# Patient Record
Sex: FEMALE
[De-identification: names, ages, dates, MRNs, and addresses within clinical notes are randomized per-mention and may not be internally consistent; named-entity substitution may affect disease eponyms.]

---

## 2021-11-21 ENCOUNTER — NURSE TRIAGE (OUTPATIENT)
Dept: OTHER | Facility: CLINIC | Age: 66
End: 2021-11-21

## 2021-11-21 NOTE — TELEPHONE ENCOUNTER
Reason for Disposition   [1] Caller requesting NON-URGENT health information AND [2] PCP's office is the best resource    Answer Assessment - Initial Assessment Questions  1. REASON FOR CALL or QUESTION: \"What is your reason for calling today? \" or \"How can I best help you? \" or \"What question do you have that I can help answer? \"      Pt wanting to voice concerns about recent medical testing/referral to cardiologist, who she feels is looking for conditions when she feels it is not warranted. Also concerned about recently beginning taking atenolol an feeling fatigued, as well as concerned about stopping it abruptly, as she no longer wants to take it. Also seeking information about getting a second opinion from a different cardiologist.    Protocols used: INFORMATION ONLY CALL - NO TRIAGE-ADULT-    Brief description of triage: No triage. Pt seeking information. Triage indicates for patient to call PCP/cardiologist office when open. Provided information on atenolol from [employer] database:    Common side effects can include: dizziness, fatigue. Beta-blocker therapy should not be withdrawn abruptly (pt is on lowest recommended dose of 25 mg daily, told by cardio to go to once every other day). Pt will call back to [facility] customer service line tomorrow for information regarding other cardiologists in network. Care advice provided, patient verbalizes understanding; denies any other questions or concerns; instructed to call back for any new or worsening symptoms. Unable to route encounter. This triage is a result of a call to [address] [place] [address]. Please do not respond to the triage nurse through this encounter. Any subsequent communication should be directly with the patient.

## 2023-03-27 DIAGNOSIS — J45.41 MODERATE PERSISTENT ALLERGIC ASTHMA WITH ACUTE EXACERBATION (HHS-HCC): ICD-10-CM

## 2023-03-27 RX ORDER — MONTELUKAST SODIUM 10 MG/1
10 TABLET ORAL DAILY
Qty: 90 TABLET | Refills: 3 | Status: SHIPPED | OUTPATIENT
Start: 2023-03-27 | End: 2024-03-18 | Stop reason: SDUPTHER

## 2023-03-27 RX ORDER — MONTELUKAST SODIUM 10 MG/1
10 TABLET ORAL DAILY
COMMUNITY
Start: 2012-12-11 | End: 2023-03-27 | Stop reason: SDUPTHER

## 2023-03-27 RX ORDER — MOMETASONE FUROATE AND FORMOTEROL FUMARATE DIHYDRATE 200; 5 UG/1; UG/1
2 AEROSOL RESPIRATORY (INHALATION)
COMMUNITY
Start: 2017-01-31 | End: 2023-03-27 | Stop reason: SDUPTHER

## 2023-03-27 RX ORDER — MOMETASONE FUROATE AND FORMOTEROL FUMARATE DIHYDRATE 200; 5 UG/1; UG/1
AEROSOL RESPIRATORY (INHALATION)
Qty: 39 G | Refills: 3 | Status: SHIPPED | OUTPATIENT
Start: 2023-03-27 | End: 2024-03-27 | Stop reason: SDUPTHER

## 2023-04-19 LAB
ANION GAP IN SER/PLAS: 10 MMOL/L (ref 10–20)
C REACTIVE PROTEIN (MG/L) IN SER/PLAS: 0.77 MG/DL
CALCIUM (MG/DL) IN SER/PLAS: 9.7 MG/DL (ref 8.6–10.3)
CARBON DIOXIDE, TOTAL (MMOL/L) IN SER/PLAS: 30 MMOL/L (ref 21–32)
CHLORIDE (MMOL/L) IN SER/PLAS: 104 MMOL/L (ref 98–107)
CREATININE (MG/DL) IN SER/PLAS: 0.63 MG/DL (ref 0.5–1.05)
ESTIMATED AVERAGE GLUCOSE FOR HBA1C: 105 MG/DL
GFR FEMALE: >90 ML/MIN/1.73M2
GLUCOSE (MG/DL) IN SER/PLAS: 83 MG/DL (ref 74–99)
HEMOGLOBIN A1C/HEMOGLOBIN TOTAL IN BLOOD: 5.3 %
POTASSIUM (MMOL/L) IN SER/PLAS: 3.9 MMOL/L (ref 3.5–5.3)
SEDIMENTATION RATE, ERYTHROCYTE: 10 MM/H (ref 0–30)
SODIUM (MMOL/L) IN SER/PLAS: 140 MMOL/L (ref 136–145)
UREA NITROGEN (MG/DL) IN SER/PLAS: 13 MG/DL (ref 6–23)

## 2023-04-24 DIAGNOSIS — I10 BENIGN ESSENTIAL HYPERTENSION: ICD-10-CM

## 2023-04-24 RX ORDER — LOSARTAN POTASSIUM 50 MG/1
50 TABLET ORAL DAILY
COMMUNITY
Start: 2013-05-03 | End: 2023-04-24 | Stop reason: SDUPTHER

## 2023-04-24 RX ORDER — LOSARTAN POTASSIUM 50 MG/1
50 TABLET ORAL DAILY
Qty: 90 TABLET | Refills: 3 | Status: SHIPPED | OUTPATIENT
Start: 2023-04-24 | End: 2024-04-19 | Stop reason: SDUPTHER

## 2023-06-07 ENCOUNTER — OFFICE VISIT (OUTPATIENT)
Dept: PRIMARY CARE | Facility: CLINIC | Age: 68
End: 2023-06-07
Payer: MEDICARE

## 2023-06-07 VITALS
HEART RATE: 79 BPM | WEIGHT: 132 LBS | TEMPERATURE: 98.6 F | SYSTOLIC BLOOD PRESSURE: 126 MMHG | HEIGHT: 60 IN | OXYGEN SATURATION: 96 % | DIASTOLIC BLOOD PRESSURE: 79 MMHG | BODY MASS INDEX: 25.91 KG/M2

## 2023-06-07 DIAGNOSIS — I10 BENIGN ESSENTIAL HYPERTENSION: ICD-10-CM

## 2023-06-07 DIAGNOSIS — J45.41 MODERATE PERSISTENT ALLERGIC ASTHMA WITH ACUTE EXACERBATION (HHS-HCC): Primary | ICD-10-CM

## 2023-06-07 DIAGNOSIS — J30.9 ALLERGIC RHINITIS, UNSPECIFIED SEASONALITY, UNSPECIFIED TRIGGER: ICD-10-CM

## 2023-06-07 DIAGNOSIS — K21.9 GASTROESOPHAGEAL REFLUX DISEASE WITHOUT ESOPHAGITIS: ICD-10-CM

## 2023-06-07 DIAGNOSIS — E11.9 DIABETES MELLITUS TYPE 2 WITHOUT RETINOPATHY (MULTI): ICD-10-CM

## 2023-06-07 DIAGNOSIS — E55.9 VITAMIN D DEFICIENCY: ICD-10-CM

## 2023-06-07 PROBLEM — M79.674 CHRONIC PAIN OF TOE OF RIGHT FOOT: Status: ACTIVE | Noted: 2023-06-07

## 2023-06-07 PROBLEM — J45.909 ASTHMA (HHS-HCC): Status: ACTIVE | Noted: 2023-06-07

## 2023-06-07 PROBLEM — M79.675 CHRONIC PAIN OF TOE OF LEFT FOOT: Status: ACTIVE | Noted: 2023-06-07

## 2023-06-07 PROBLEM — I28.8 ENLARGED PULMONARY ARTERY (MULTI): Status: ACTIVE | Noted: 2023-06-07

## 2023-06-07 PROBLEM — E66.3 OVERWEIGHT: Status: ACTIVE | Noted: 2023-06-07

## 2023-06-07 PROBLEM — R11.2 NAUSEA WITH VOMITING: Status: ACTIVE | Noted: 2023-06-07

## 2023-06-07 PROBLEM — R42 VERTIGO: Status: ACTIVE | Noted: 2023-06-07

## 2023-06-07 PROBLEM — L82.0 SEBORRHEIC KERATOSES, INFLAMED: Status: ACTIVE | Noted: 2023-06-07

## 2023-06-07 PROBLEM — B37.31 VAGINAL CANDIDIASIS: Status: ACTIVE | Noted: 2023-06-07

## 2023-06-07 PROBLEM — S92.323A CLOSED FRACTURE OF SECOND METATARSAL BONE: Status: ACTIVE | Noted: 2023-06-07

## 2023-06-07 PROBLEM — R94.39 ABNORMAL STRESS TEST: Status: ACTIVE | Noted: 2023-06-07

## 2023-06-07 PROBLEM — L30.9 ECZEMA: Status: ACTIVE | Noted: 2023-06-07

## 2023-06-07 PROBLEM — N76.0 BACTERIAL VAGINOSIS: Status: ACTIVE | Noted: 2023-06-07

## 2023-06-07 PROBLEM — I71.21 ANEURYSM OF ASCENDING AORTA (CMS-HCC): Status: ACTIVE | Noted: 2023-06-07

## 2023-06-07 PROBLEM — R68.89 FLU-LIKE SYMPTOMS: Status: ACTIVE | Noted: 2023-06-07

## 2023-06-07 PROBLEM — L27.0 ALLERGIC DRUG RASH: Status: ACTIVE | Noted: 2023-06-07

## 2023-06-07 PROBLEM — Q25.79 PULMONARY ARTERY ABNORMALITY (HHS-HCC): Status: ACTIVE | Noted: 2023-06-07

## 2023-06-07 PROBLEM — J02.9 SORE THROAT: Status: ACTIVE | Noted: 2023-06-07

## 2023-06-07 PROBLEM — N64.89 BREAST ASYMMETRY: Status: ACTIVE | Noted: 2023-06-07

## 2023-06-07 PROBLEM — B35.1 NAIL FUNGUS: Status: ACTIVE | Noted: 2023-06-07

## 2023-06-07 PROBLEM — I28.8 ENLARGED PULMONARY ARTERY (MULTI): Status: RESOLVED | Noted: 2023-06-07 | Resolved: 2023-06-07

## 2023-06-07 PROBLEM — G89.29 CHRONIC PAIN OF TOE OF LEFT FOOT: Status: ACTIVE | Noted: 2023-06-07

## 2023-06-07 PROBLEM — J22 ACUTE RESPIRATORY INFECTION: Status: ACTIVE | Noted: 2023-06-07

## 2023-06-07 PROBLEM — R29.890 LOSS OF HEIGHT: Status: ACTIVE | Noted: 2023-06-07

## 2023-06-07 PROBLEM — M85.80 OSTEOPENIA: Status: ACTIVE | Noted: 2023-06-07

## 2023-06-07 PROBLEM — N89.8 VAGINAL ITCHING: Status: ACTIVE | Noted: 2023-06-07

## 2023-06-07 PROBLEM — R06.81 WITNESSED APNEIC SPELLS: Status: ACTIVE | Noted: 2023-06-07

## 2023-06-07 PROBLEM — R06.00 DYSPNEA: Status: ACTIVE | Noted: 2023-06-07

## 2023-06-07 PROBLEM — N39.0 ACUTE UTI: Status: ACTIVE | Noted: 2023-06-07

## 2023-06-07 PROBLEM — R06.02 SOB (SHORTNESS OF BREATH): Status: ACTIVE | Noted: 2023-06-07

## 2023-06-07 PROBLEM — R73.01 IMPAIRED FASTING GLUCOSE: Status: ACTIVE | Noted: 2023-06-07

## 2023-06-07 PROBLEM — L98.9 SKIN LESION: Status: ACTIVE | Noted: 2023-06-07

## 2023-06-07 PROBLEM — N76.0 VAGINITIS: Status: ACTIVE | Noted: 2023-06-07

## 2023-06-07 PROBLEM — N90.89 VULVAR IRRITATION: Status: ACTIVE | Noted: 2023-06-07

## 2023-06-07 PROBLEM — G89.29 CHRONIC PAIN OF TOE OF RIGHT FOOT: Status: ACTIVE | Noted: 2023-06-07

## 2023-06-07 PROBLEM — E78.5 HYPERLIPIDEMIA: Status: ACTIVE | Noted: 2023-06-07

## 2023-06-07 PROBLEM — M71.371 OTHER BURSAL CYST, RIGHT ANKLE AND FOOT: Status: ACTIVE | Noted: 2023-06-07

## 2023-06-07 PROBLEM — H35.30 MACULAR DEGENERATION: Status: ACTIVE | Noted: 2023-06-07

## 2023-06-07 PROBLEM — B96.89 BACTERIAL VAGINOSIS: Status: ACTIVE | Noted: 2023-06-07

## 2023-06-07 PROBLEM — R21 RASH: Status: ACTIVE | Noted: 2023-06-07

## 2023-06-07 PROBLEM — L29.9 PRURITUS: Status: ACTIVE | Noted: 2023-06-07

## 2023-06-07 PROBLEM — I71.21 ANEURYSM OF ASCENDING AORTA (CMS-HCC): Status: RESOLVED | Noted: 2023-06-07 | Resolved: 2023-06-07

## 2023-06-07 PROBLEM — R29.818 SUSPECTED SLEEP APNEA: Status: ACTIVE | Noted: 2023-06-07

## 2023-06-07 PROBLEM — N89.8 VAGINAL DISCHARGE: Status: ACTIVE | Noted: 2023-06-07

## 2023-06-07 PROCEDURE — 1159F MED LIST DOCD IN RCRD: CPT | Performed by: INTERNAL MEDICINE

## 2023-06-07 PROCEDURE — 3078F DIAST BP <80 MM HG: CPT | Performed by: INTERNAL MEDICINE

## 2023-06-07 PROCEDURE — 3044F HG A1C LEVEL LT 7.0%: CPT | Performed by: INTERNAL MEDICINE

## 2023-06-07 PROCEDURE — 3074F SYST BP LT 130 MM HG: CPT | Performed by: INTERNAL MEDICINE

## 2023-06-07 PROCEDURE — 99214 OFFICE O/P EST MOD 30 MIN: CPT | Performed by: INTERNAL MEDICINE

## 2023-06-07 PROCEDURE — 4010F ACE/ARB THERAPY RXD/TAKEN: CPT | Performed by: INTERNAL MEDICINE

## 2023-06-07 PROCEDURE — 1036F TOBACCO NON-USER: CPT | Performed by: INTERNAL MEDICINE

## 2023-06-07 PROCEDURE — 1160F RVW MEDS BY RX/DR IN RCRD: CPT | Performed by: INTERNAL MEDICINE

## 2023-06-07 RX ORDER — ESOMEPRAZOLE MAGNESIUM 40 MG/1
40 CAPSULE, DELAYED RELEASE ORAL DAILY
COMMUNITY
Start: 2013-06-10

## 2023-06-07 RX ORDER — VITS A,C,E/LUTEIN/MINERALS 300MCG-200
1 TABLET ORAL DAILY
COMMUNITY
Start: 2012-12-07 | End: 2023-10-21 | Stop reason: ALTCHOICE

## 2023-06-07 RX ORDER — MULTIVITAMIN
1 TABLET ORAL DAILY
COMMUNITY
End: 2023-06-10 | Stop reason: ALTCHOICE

## 2023-06-07 RX ORDER — CHOLECALCIFEROL (VITAMIN D3) 25 MCG
25 TABLET ORAL DAILY
COMMUNITY

## 2023-06-07 RX ORDER — PHENYLEPHRINE HCL 10 MG
1000 TABLET ORAL DAILY
COMMUNITY

## 2023-06-07 RX ORDER — AMOXICILLIN 500 MG
1 CAPSULE ORAL DAILY
COMMUNITY

## 2023-06-07 RX ORDER — OLOPATADINE HYDROCHLORIDE 1 MG/ML
1 SOLUTION/ DROPS OPHTHALMIC 2 TIMES DAILY
COMMUNITY
Start: 2020-06-29

## 2023-06-07 RX ORDER — CALCIUM CITRATE/VITAMIN D3 200MG-6.25
1 TABLET ORAL DAILY
COMMUNITY
Start: 2019-05-17

## 2023-06-07 RX ORDER — LORATADINE 10 MG/1
1 CAPSULE, LIQUID FILLED ORAL AS NEEDED
COMMUNITY
Start: 2016-05-17

## 2023-06-07 RX ORDER — ALBUTEROL SULFATE 90 UG/1
2 AEROSOL, METERED RESPIRATORY (INHALATION) EVERY 6 HOURS PRN
COMMUNITY
End: 2024-02-06 | Stop reason: SDUPTHER

## 2023-06-07 ASSESSMENT — PATIENT HEALTH QUESTIONNAIRE - PHQ9
SUM OF ALL RESPONSES TO PHQ9 QUESTIONS 1 AND 2: 0
2. FEELING DOWN, DEPRESSED OR HOPELESS: NOT AT ALL
1. LITTLE INTEREST OR PLEASURE IN DOING THINGS: NOT AT ALL

## 2023-06-07 NOTE — PROGRESS NOTES
"Subjective   Patient ID: Lydia Lowe is a 67 y.o. female who presents for 4 month follow up.    Here for follow up.  She has h/o mild intermittent asthma and to follow up from recent  visit for exacerbation asthma,now feeling better.  she was seen by cardiologist and Rheumatologist for possible aortitis and GCA,just had Bx left temporal artery by Dr Montesinos on 1/17/23 and there was no evidence of GCA.  She was referred by Dr Calles to card vascula specialist,pt \"is thinking about it and does not think it is necessary\"     she has HTN,mild intermittent asthma,allergic rhinitis .her diabetes is well controlled with diet only.last A1c at goal,she continue to walk regularly and lost some weight,no CP,SOB.her allergies are stable.her endo Dr Martinez retired and I will be taking care of her diabetes that is well controlled with lifestyle modification.     she gets eye exam regularly by Dr Ha(retina),Dr Keys eye 4/2021 up to date     she sees gyn for pelvic and breast exam.     in past she saw Dr Bailey and had negative stress test and had echo.               Review of Systems   Reason unable to perform ROS: as HPI.       Objective   /79 (BP Location: Left arm, Patient Position: Sitting, BP Cuff Size: Large adult)   Pulse 79   Temp 37 °C (98.6 °F) (Temporal)   Ht 1.524 m (5')   Wt 59.9 kg (132 lb)   SpO2 96%   BMI 25.78 kg/m²     Physical Exam  Constitutional:       Appearance: Normal appearance.   HENT:      Head: Normocephalic and atraumatic.   Eyes:      Extraocular Movements: Extraocular movements intact.      Pupils: Pupils are equal, round, and reactive to light.   Cardiovascular:      Rate and Rhythm: Normal rate and regular rhythm.      Heart sounds: Normal heart sounds.   Pulmonary:      Effort: Pulmonary effort is normal.      Breath sounds: Normal breath sounds. No wheezing or rhonchi.   Abdominal:      General: There is no distension.   Musculoskeletal:         General: Normal " range of motion.      Cervical back: Normal range of motion and neck supple.      Left lower leg: No edema.   Skin:     General: Skin is warm.   Neurological:      General: No focal deficit present.      Mental Status: She is alert and oriented to person, place, and time.   Psychiatric:         Mood and Affect: Mood normal.         Behavior: Behavior normal.         Assessment/Plan   Problem List Items Addressed This Visit          Respiratory    Moderate persistent allergic asthma with acute exacerbation - Primary       Circulatory    Benign essential hypertension     Stable on current med.            Digestive    GERD (gastroesophageal reflux disease)     Avoid offending food.            Endocrine/Metabolic    Diabetes mellitus type 2 without retinopathy (CMS/HCC)     Well controlled with diet and exercise.  Order A1C.         Relevant Orders    Hemoglobin A1C    Vitamin D deficiency       Other    Allergic rhinitis

## 2023-06-09 RX ORDER — MOMETASONE FUROATE 50 UG/1
2 SPRAY, METERED NASAL DAILY
COMMUNITY

## 2023-06-19 NOTE — PROGRESS NOTES
Subjective   Patient ID: Lydia Lowe is a 67 y.o. female who presents for 4 month follow up.    This is a late entry.  Pt's current retina specialist is Dr Demond Palacio.  Pt was seen at [facility] ER and not  for her recent asthma flare up.         Review of Systems    Objective   /79 (BP Location: Left arm, Patient Position: Sitting, BP Cuff Size: Large adult)   Pulse 79   Temp 37 °C (98.6 °F) (Temporal)   Ht 1.524 m (5')   Wt 59.9 kg (132 lb)   SpO2 96%   BMI 25.78 kg/m²     Physical Exam    Assessment/Plan

## 2023-10-06 ENCOUNTER — HOSPITAL ENCOUNTER (OUTPATIENT)
Dept: RADIOLOGY | Facility: HOSPITAL | Age: 68
Discharge: HOME | End: 2023-10-06
Payer: MEDICARE

## 2023-10-06 VITALS — BODY MASS INDEX: 25.13 KG/M2 | HEIGHT: 60 IN | WEIGHT: 128 LBS

## 2023-10-06 DIAGNOSIS — Z12.31 ENCOUNTER FOR SCREENING MAMMOGRAM FOR MALIGNANT NEOPLASM OF BREAST: ICD-10-CM

## 2023-10-06 PROCEDURE — 77067 SCR MAMMO BI INCL CAD: CPT | Mod: BILATERAL PROCEDURE | Performed by: STUDENT IN AN ORGANIZED HEALTH CARE EDUCATION/TRAINING PROGRAM

## 2023-10-06 PROCEDURE — 77063 BREAST TOMOSYNTHESIS BI: CPT | Mod: BILATERAL PROCEDURE | Performed by: STUDENT IN AN ORGANIZED HEALTH CARE EDUCATION/TRAINING PROGRAM

## 2023-10-06 PROCEDURE — 77067 SCR MAMMO BI INCL CAD: CPT | Mod: 50

## 2023-10-10 ENCOUNTER — TELEPHONE (OUTPATIENT)
Dept: OBSTETRICS AND GYNECOLOGY | Facility: CLINIC | Age: 68
End: 2023-10-10
Payer: MEDICARE

## 2023-10-10 NOTE — TELEPHONE ENCOUNTER
Shivani Marie, APRN-CNP  [id number] Clinical Support Staff  Hi - I messaged the patient regarding this because she had already viewed the result, but if you could just confirm that she has received that elena and review that she needs a diagnostic mammo. Mammo is already ordered. Thank you!    Pt contacted and notified of above.

## 2023-10-11 ENCOUNTER — TELEPHONE (OUTPATIENT)
Dept: OBSTETRICS AND GYNECOLOGY | Facility: CLINIC | Age: 68
End: 2023-10-11
Payer: MEDICARE

## 2023-10-11 NOTE — TELEPHONE ENCOUNTER
Pt contacted.    Discussed recent mammogram.   Pt informed it was compared to previous films from 2022, 2021, and 2020.

## 2023-10-17 ENCOUNTER — LAB (OUTPATIENT)
Dept: LAB | Facility: LAB | Age: 68
End: 2023-10-17
Payer: MEDICARE

## 2023-10-17 DIAGNOSIS — E11.9 DIABETES MELLITUS TYPE 2 WITHOUT RETINOPATHY (MULTI): ICD-10-CM

## 2023-10-17 LAB
EST. AVERAGE GLUCOSE BLD GHB EST-MCNC: 108 MG/DL
HBA1C MFR BLD: 5.4 %

## 2023-10-17 PROCEDURE — 36415 COLL VENOUS BLD VENIPUNCTURE: CPT

## 2023-10-17 PROCEDURE — 83036 HEMOGLOBIN GLYCOSYLATED A1C: CPT

## 2023-10-19 PROBLEM — R70.0 ESR RAISED: Status: ACTIVE | Noted: 2023-10-19

## 2023-10-19 PROBLEM — L28.0 LICHEN SIMPLEX CHRONICUS: Status: ACTIVE | Noted: 2018-07-06

## 2023-10-19 PROBLEM — I77.6: Status: ACTIVE | Noted: 2023-10-19

## 2023-10-19 PROBLEM — L82.1 SEBORRHEIC KERATOSIS: Status: ACTIVE | Noted: 2018-07-06

## 2023-10-19 PROBLEM — D36.7 BENIGN NEOPLASM OF TRUNK: Status: ACTIVE | Noted: 2018-07-06

## 2023-10-19 RX ORDER — FLUTICASONE PROPIONATE 0.5 MG/G
CREAM TOPICAL
COMMUNITY

## 2023-10-19 RX ORDER — MUPIROCIN 20 MG/G
1 OINTMENT TOPICAL
COMMUNITY
Start: 2018-05-24 | End: 2023-10-20 | Stop reason: ALTCHOICE

## 2023-10-19 RX ORDER — MUPIROCIN CALCIUM 20 MG/G
1 CREAM TOPICAL
COMMUNITY
Start: 2018-05-23 | End: 2023-10-20 | Stop reason: ALTCHOICE

## 2023-10-19 RX ORDER — CLOBETASOL PROPIONATE 0.5 MG/G
1 CREAM TOPICAL
COMMUNITY
Start: 2023-08-17 | End: 2024-03-14 | Stop reason: WASHOUT

## 2023-10-19 RX ORDER — NYSTATIN 100000 [USP'U]/ML
SUSPENSION ORAL
COMMUNITY
Start: 2023-09-27 | End: 2023-10-20 | Stop reason: ALTCHOICE

## 2023-10-19 RX ORDER — BISOPROLOL FUMARATE 5 MG/1
2.5 TABLET, FILM COATED ORAL DAILY
COMMUNITY
Start: 2022-12-08 | End: 2023-10-21 | Stop reason: ALTCHOICE

## 2023-10-20 ENCOUNTER — OFFICE VISIT (OUTPATIENT)
Dept: PRIMARY CARE | Facility: CLINIC | Age: 68
End: 2023-10-20
Payer: MEDICARE

## 2023-10-20 VITALS
SYSTOLIC BLOOD PRESSURE: 127 MMHG | TEMPERATURE: 97.7 F | DIASTOLIC BLOOD PRESSURE: 82 MMHG | OXYGEN SATURATION: 95 % | HEART RATE: 82 BPM | BODY MASS INDEX: 26.95 KG/M2 | WEIGHT: 128.4 LBS | HEIGHT: 58 IN

## 2023-10-20 DIAGNOSIS — Z00.00 HEALTHCARE MAINTENANCE: ICD-10-CM

## 2023-10-20 DIAGNOSIS — E78.49 OTHER HYPERLIPIDEMIA: ICD-10-CM

## 2023-10-20 DIAGNOSIS — K21.9 GASTROESOPHAGEAL REFLUX DISEASE WITHOUT ESOPHAGITIS: ICD-10-CM

## 2023-10-20 DIAGNOSIS — E11.9 DIABETES MELLITUS TYPE 2 WITHOUT RETINOPATHY (MULTI): ICD-10-CM

## 2023-10-20 DIAGNOSIS — Z00.00 MEDICARE ANNUAL WELLNESS VISIT, SUBSEQUENT: Primary | ICD-10-CM

## 2023-10-20 DIAGNOSIS — H35.3222 EXUDATIVE AGE-RELATED MACULAR DEGENERATION, LEFT EYE, WITH INACTIVE CHOROIDAL NEOVASCULARIZATION (MULTI): ICD-10-CM

## 2023-10-20 DIAGNOSIS — M85.80 OSTEOPENIA, UNSPECIFIED LOCATION: ICD-10-CM

## 2023-10-20 DIAGNOSIS — Z00.00 ANNUAL PHYSICAL EXAM: ICD-10-CM

## 2023-10-20 DIAGNOSIS — I10 BENIGN ESSENTIAL HYPERTENSION: ICD-10-CM

## 2023-10-20 DIAGNOSIS — E55.9 VITAMIN D DEFICIENCY: ICD-10-CM

## 2023-10-20 DIAGNOSIS — J45.20 MILD INTERMITTENT ASTHMA WITHOUT COMPLICATION (HHS-HCC): ICD-10-CM

## 2023-10-20 DIAGNOSIS — Z00.00 ROUTINE GENERAL MEDICAL EXAMINATION AT HEALTH CARE FACILITY: ICD-10-CM

## 2023-10-20 PROCEDURE — 99397 PER PM REEVAL EST PAT 65+ YR: CPT | Performed by: INTERNAL MEDICINE

## 2023-10-20 PROCEDURE — 4010F ACE/ARB THERAPY RXD/TAKEN: CPT | Performed by: INTERNAL MEDICINE

## 2023-10-20 PROCEDURE — 1170F FXNL STATUS ASSESSED: CPT | Performed by: INTERNAL MEDICINE

## 2023-10-20 PROCEDURE — 1160F RVW MEDS BY RX/DR IN RCRD: CPT | Performed by: INTERNAL MEDICINE

## 2023-10-20 PROCEDURE — 1036F TOBACCO NON-USER: CPT | Performed by: INTERNAL MEDICINE

## 2023-10-20 PROCEDURE — 3044F HG A1C LEVEL LT 7.0%: CPT | Performed by: INTERNAL MEDICINE

## 2023-10-20 PROCEDURE — 1126F AMNT PAIN NOTED NONE PRSNT: CPT | Performed by: INTERNAL MEDICINE

## 2023-10-20 PROCEDURE — 3074F SYST BP LT 130 MM HG: CPT | Performed by: INTERNAL MEDICINE

## 2023-10-20 PROCEDURE — 1159F MED LIST DOCD IN RCRD: CPT | Performed by: INTERNAL MEDICINE

## 2023-10-20 PROCEDURE — G0442 ANNUAL ALCOHOL SCREEN 15 MIN: HCPCS | Performed by: INTERNAL MEDICINE

## 2023-10-20 PROCEDURE — G0008 ADMIN INFLUENZA VIRUS VAC: HCPCS | Performed by: INTERNAL MEDICINE

## 2023-10-20 PROCEDURE — G0439 PPPS, SUBSEQ VISIT: HCPCS | Performed by: INTERNAL MEDICINE

## 2023-10-20 PROCEDURE — G0444 DEPRESSION SCREEN ANNUAL: HCPCS | Performed by: INTERNAL MEDICINE

## 2023-10-20 PROCEDURE — 90662 IIV NO PRSV INCREASED AG IM: CPT | Performed by: INTERNAL MEDICINE

## 2023-10-20 PROCEDURE — 3079F DIAST BP 80-89 MM HG: CPT | Performed by: INTERNAL MEDICINE

## 2023-10-20 ASSESSMENT — ACTIVITIES OF DAILY LIVING (ADL)
DRESSING: INDEPENDENT
TAKING_MEDICATION: INDEPENDENT
BATHING: INDEPENDENT
MANAGING_FINANCES: INDEPENDENT
GROCERY_SHOPPING: INDEPENDENT
DOING_HOUSEWORK: INDEPENDENT

## 2023-10-20 ASSESSMENT — PATIENT HEALTH QUESTIONNAIRE - PHQ9
2. FEELING DOWN, DEPRESSED OR HOPELESS: NOT AT ALL
1. LITTLE INTEREST OR PLEASURE IN DOING THINGS: NOT AT ALL
SUM OF ALL RESPONSES TO PHQ9 QUESTIONS 1 AND 2: 0

## 2023-10-20 NOTE — PROGRESS NOTES
"Subjective   Patient ID: Lydia Lowe is a 68 y.o. female who presents for Medicare Annual Wellness Visit Subsequent and Annual Exam.    Here for MCR and physical.  she has HTN,mild intermittent asthma,allergic rhinitis .her diabetes is well controlled with diet only.  She sees Dr Palacio and Dr Keys for her eyes,has h/o retinal hemorrhage R eye,not related to diabetes.she has bilateral cataracts.         Review of Systems   Constitutional: Negative.    HENT: Negative.     Eyes: Negative.    Respiratory: Negative.     Cardiovascular: Negative.    Gastrointestinal: Negative.    Genitourinary: Negative.    Neurological: Negative.    Hematological: Negative.    Psychiatric/Behavioral: Negative.         Objective   /82 (BP Location: Left arm, Patient Position: Sitting, BP Cuff Size: Adult)   Pulse 82   Temp 36.5 °C (97.7 °F) (Temporal)   Ht 1.48 m (4' 10.25\")   Wt 58.2 kg (128 lb 6.4 oz)   SpO2 95%   BMI 26.61 kg/m²     Physical Exam  Vitals reviewed.   Constitutional:       Appearance: Normal appearance.   HENT:      Head: Normocephalic and atraumatic.      Right Ear: Tympanic membrane and ear canal normal.      Left Ear: Tympanic membrane and ear canal normal.   Eyes:      Extraocular Movements: Extraocular movements intact.      Pupils: Pupils are equal, round, and reactive to light.   Cardiovascular:      Rate and Rhythm: Normal rate and regular rhythm.      Pulses: Normal pulses.      Heart sounds: Normal heart sounds. No murmur heard.  Pulmonary:      Effort: Pulmonary effort is normal.      Breath sounds: Normal breath sounds.   Abdominal:      General: Abdomen is flat. Bowel sounds are normal.      Palpations: Abdomen is soft.   Musculoskeletal:         General: Normal range of motion.      Cervical back: Normal range of motion and neck supple.   Neurological:      General: No focal deficit present.      Mental Status: She is alert and oriented to person, place, and time.   Psychiatric:         " Mood and Affect: Mood normal.         Assessment/Plan   Problem List Items Addressed This Visit             ICD-10-CM    Asthma J45.909     Stable on current inhalers.         Benign essential hypertension I10     Stable on losartan.         Relevant Orders    CBC and Auto Differential    Diabetes mellitus type 2 without retinopathy (CMS/HCC) E11.9     Well-controlled with diet and exercise.  No retinopathy from diabetes on eye exam.         Relevant Orders    Comprehensive Metabolic Panel    Hemoglobin A1C    Albumin , Urine Random    GERD (gastroesophageal reflux disease) K21.9     Avoid offending food continue PPI.         Hyperlipidemia E78.5     Follow low-fat diet.         Relevant Orders    Lipid Panel    TSH with reflex to Free T4 if abnormal    Osteopenia M85.80    Vitamin D deficiency E55.9    Relevant Orders    Vitamin D 25-Hydroxy,Total (for eval of Vitamin D levels)    Medicare annual wellness visit, subsequent - Primary Z00.00    Annual physical exam Z00.00     Here we will give patient high-dose flu vaccine we will order yearly lab.  Continue regular exercise and healthy eating habits.  Colonoscopy up-to-date.         Exudative age-related macular degeneration, left eye, with inactive choroidal neovascularization (CMS/[facility]) H35.3222     Seen by her ophthalmologist regularly.          Other Visit Diagnoses         Codes    Healthcare maintenance     Z00.00    Relevant Orders    Flu vaccine, quadrivalent, high-dose, preservative free, age 65y+ (FLUZONE) (Completed)    Routine general medical examination at health care facility     Z00.00

## 2023-10-21 PROBLEM — R06.00 DYSPNEA: Status: RESOLVED | Noted: 2023-06-07 | Resolved: 2023-10-21

## 2023-10-21 PROBLEM — R70.0 ESR RAISED: Status: RESOLVED | Noted: 2023-10-19 | Resolved: 2023-10-21

## 2023-10-21 PROBLEM — R68.89 FLU-LIKE SYMPTOMS: Status: RESOLVED | Noted: 2023-06-07 | Resolved: 2023-10-21

## 2023-10-21 PROBLEM — N39.0 ACUTE UTI: Status: RESOLVED | Noted: 2023-06-07 | Resolved: 2023-10-21

## 2023-10-21 PROBLEM — S92.323A CLOSED FRACTURE OF SECOND METATARSAL BONE: Status: RESOLVED | Noted: 2023-06-07 | Resolved: 2023-10-21

## 2023-10-21 PROBLEM — H35.3222 EXUDATIVE AGE-RELATED MACULAR DEGENERATION, LEFT EYE, WITH INACTIVE CHOROIDAL NEOVASCULARIZATION (MULTI): Status: ACTIVE | Noted: 2023-10-21

## 2023-10-21 PROBLEM — I77.6: Status: RESOLVED | Noted: 2023-10-19 | Resolved: 2023-10-21

## 2023-10-21 PROBLEM — J22 ACUTE RESPIRATORY INFECTION: Status: RESOLVED | Noted: 2023-06-07 | Resolved: 2023-10-21

## 2023-10-21 ASSESSMENT — ENCOUNTER SYMPTOMS
CARDIOVASCULAR NEGATIVE: 1
PSYCHIATRIC NEGATIVE: 1
HEMATOLOGIC/LYMPHATIC NEGATIVE: 1
EYES NEGATIVE: 1
RESPIRATORY NEGATIVE: 1
GASTROINTESTINAL NEGATIVE: 1
NEUROLOGICAL NEGATIVE: 1
CONSTITUTIONAL NEGATIVE: 1

## 2023-10-21 NOTE — ASSESSMENT & PLAN NOTE
Here we will give patient high-dose flu vaccine we will order yearly lab.  Continue regular exercise and healthy eating habits.  Colonoscopy up-to-date.

## 2023-10-23 ENCOUNTER — HOSPITAL ENCOUNTER (OUTPATIENT)
Dept: RADIOLOGY | Facility: HOSPITAL | Age: 68
Discharge: HOME | End: 2023-10-23
Payer: MEDICARE

## 2023-10-23 VITALS — BODY MASS INDEX: 26.87 KG/M2 | HEIGHT: 58 IN | WEIGHT: 128 LBS

## 2023-10-23 DIAGNOSIS — N64.89 BREAST ASYMMETRY: Primary | ICD-10-CM

## 2023-10-23 DIAGNOSIS — R92.8 OTHER ABNORMAL AND INCONCLUSIVE FINDINGS ON DIAGNOSTIC IMAGING OF BREAST: ICD-10-CM

## 2023-10-23 PROCEDURE — 77065 DX MAMMO INCL CAD UNI: CPT | Mod: RIGHT SIDE | Performed by: STUDENT IN AN ORGANIZED HEALTH CARE EDUCATION/TRAINING PROGRAM

## 2023-10-23 PROCEDURE — G0279 TOMOSYNTHESIS, MAMMO: HCPCS | Mod: RIGHT SIDE | Performed by: STUDENT IN AN ORGANIZED HEALTH CARE EDUCATION/TRAINING PROGRAM

## 2023-10-23 PROCEDURE — 76642 ULTRASOUND BREAST LIMITED: CPT | Mod: RIGHT SIDE | Performed by: STUDENT IN AN ORGANIZED HEALTH CARE EDUCATION/TRAINING PROGRAM

## 2023-10-23 PROCEDURE — 77061 BREAST TOMOSYNTHESIS UNI: CPT | Mod: RT

## 2023-10-23 PROCEDURE — 76642 ULTRASOUND BREAST LIMITED: CPT | Mod: RT

## 2023-10-23 NOTE — RESULT ENCOUNTER NOTE
Just a heads up... Abnormal right mammo and us. I ordered repeat right diagnostic for 6 months out. I attempted to call pt but received VM. She may return our call but might hear from scheduling first!

## 2023-10-24 ENCOUNTER — TELEPHONE (OUTPATIENT)
Dept: OBSTETRICS AND GYNECOLOGY | Facility: CLINIC | Age: 68
End: 2023-10-24
Payer: MEDICARE

## 2023-10-24 NOTE — TELEPHONE ENCOUNTER
----- Message from ROBBY Lester sent at 10/23/2023  4:45 PM EDT -----  Just a heads up... Abnormal right mammo and us. I ordered repeat right diagnostic for 6 months out. I attempted to call pt but received VM. She may return our call but might hear from scheduling first!

## 2023-10-24 NOTE — TELEPHONE ENCOUNTER
Pt CB and is aware of 10/23/23 Right dx mammo and US results and recommendation for 6 month follow up. Pt stated that she is unsure if she will get the follow up or wait until her next screening.

## 2023-11-06 ENCOUNTER — PROCEDURE VISIT (OUTPATIENT)
Dept: PODIATRY | Facility: CLINIC | Age: 68
End: 2023-11-06
Payer: MEDICARE

## 2023-11-06 DIAGNOSIS — B35.1 NAIL FUNGUS: ICD-10-CM

## 2023-11-06 DIAGNOSIS — G89.29 CHRONIC PAIN OF TOE OF LEFT FOOT: Primary | ICD-10-CM

## 2023-11-06 DIAGNOSIS — G89.29 CHRONIC PAIN OF TOE OF RIGHT FOOT: ICD-10-CM

## 2023-11-06 DIAGNOSIS — M79.675 CHRONIC PAIN OF TOE OF LEFT FOOT: Primary | ICD-10-CM

## 2023-11-06 DIAGNOSIS — M79.674 CHRONIC PAIN OF TOE OF RIGHT FOOT: ICD-10-CM

## 2023-11-06 PROCEDURE — 11721 DEBRIDE NAIL 6 OR MORE: CPT | Performed by: PODIATRIST

## 2023-11-06 NOTE — PROGRESS NOTES
/History Of Present Illness  Lydia Lowe is a 68 y.o. female presenting for diabetic nail care. Patient complains with painful elongated nails.     Past Medical History  She has a past medical history of Allergic (1980s?), Asthma (2000?), Cataract (2022), Diabetes mellitus (CMS/[facility]) (2016), Eczema (2018), Encounter for general adult medical examination without abnormal findings (11/14/2012), Encounter for gynecological examination (general) (routine) without abnormal findings, ESR raised (10/19/2023), Gastro-esophageal reflux disease with esophagitis, without bleeding (07/18/2014), GERD (gastroesophageal reflux disease) (1999), Hypertension (2001?), Impaired fasting glucose, Other conditions influencing health status, Other conditions influencing health status (12/12/2012), Other specified noninflammatory disorders of vulva and perineum (08/30/2021), Personal history of diseases of the skin and subcutaneous tissue (04/12/2013), Personal history of other diseases of the circulatory system, Personal history of other diseases of the respiratory system, Personal history of other diseases of the respiratory system, Personal history of other diseases of the respiratory system, Urinary tract infection (Over 5 years ago?), Varicella (1958?), and Visual impairment (1963?).    Surgical History  She has a past surgical history that includes Colonoscopy (11/27/2012); Other surgical history (11/27/2012); Dilation and curettage of uterus (12/07/2012); Other surgical history (08/18/2017); Eye surgery (1993); and [place] tooth extraction (1994?).     Social History  She reports that she has never smoked. She has never used smokeless tobacco. She reports current alcohol use. She reports that she does not use drugs.    Family History  Family History   Problem Relation Name Age of Onset    Breast cancer Mother Mom? 81    Asthma Mother Mom?     Hypertension Mother Mom?     Brain cancer Father Dad?     Diabetes Father Dad?      Colon cancer Father Dad?     COPD Father Dad?     Hypertension Father Dad?     Heart attack Sister      Stroke Sister      Coronary artery disease Brother          CABG    Stroke Brother      Hypertension Sibling      Stroke Sister Sheridan         Allergies  Carbapenems, Penicillins, Sulfa (sulfonamide antibiotics), and Nitrofurantoin monohyd/m-cryst    Medications  Current Outpatient Medications   Medication Sig Dispense Refill    albuterol 90 mcg/actuation inhaler Inhale 2 puffs every 6 hours if needed for wheezing. 2 puffs every 4-6 hrs prn.      isma cit-mag-D3-Zn--man-bor (Citracal-D3 Plus Magnesium) 250- mg-mg-unit tablet Take 1 tablet by mouth once daily.      cholecalciferol (Vitamin D3) 25 MCG (1000 UT) tablet Take 1 tablet (25 mcg) by mouth once daily.      cinnamon 500 mg capsule Take 1,000 capsules (500,000 mg) by mouth once daily.      clobetasol (Temovate) 0.05 % cream Apply 1 Application topically.      esomeprazole (NexIUM) 40 mg DR capsule Take 1 capsule (40 mg) by mouth once daily.      fluticasone (Cutivate) 0.05 % cream Apply topically.      loratadine (Claritin Liqui-Gel) 10 mg capsule Take 1 capsule by mouth if needed.      losartan (Cozaar) 50 mg tablet Take 1 tablet (50 mg) by mouth once daily. 90 tablet 3    mometasone (Nasonex) 50 mcg/actuation nasal spray Administer 2 sprays into each nostril once daily.      mometasone-formoterol (Dulera) 200-5 mcg/actuation inhaler INHALE 2 PUFFS AT 12 HOUR INTERVALS (MORNING AND EVENING) 39 g 3    montelukast (Singulair) 10 mg tablet Take 1 tablet (10 mg) by mouth once daily. 90 tablet 3    multivit-min/iron/folic/lutein (CENTRUM SILVER WOMEN ORAL) Take 1 tablet by mouth once daily.      olopatadine (Patanol) 0.1 % ophthalmic solution Administer 1 drop into both eyes 2 times a day. PRN      omega-3 fatty acids-fish oil 300-1,000 mg capsule Take 1 capsule (1,000 mg) by mouth once daily.       No current facility-administered medications for this  visit.       Review of Systems    REVIEW OF SYSTEMS  GENERAL:  Negative for malaise, significant weight loss, fever  CARDIOVASCULAR: leg swelling   MUSCULOSKELETAL:  Negative for joint pain or swelling, back pain, and muscle pain.  SKIN:  Negative for lesions, rash, and itching  PSYCH:  Negative for sleep disturbance, mood disorder and recent psychosocial stressors  NEURO: Negative, denies any burning, tingling or numbness     Objective:   Vasc: DP and PT pulses are palpable bilateral.  CFT is less than 3 seconds bilateral.  Skin temperature is warm to cool proximal to distal bilateral.      Neuro:  Light touch is intact to the foot bilateral.   There is no clonus noted.  The hallux is downgoing bilateral.      Derm: Nails 1-5 bilateral are thickened, elongated and crumbly with subungual debris. Skin is supple with normal texture and turgor noted.  Webspaces are clean, dry and intact bilateral.  There are no hyperkeratoses, ulcerations, verruca or other lesions noted.      Ortho: Muscle strength is 5/5 for all pedal groups tested.  Ankle joint, subtalar joint, 1st MPJ and lesser MPJ ROM is full and without pain or crepitus.  The foot type is rectus bilateral off weight bearing.  There are no structural deformities noted.    Assessment/Plan     Diagnoses and all orders for this visit:  Chronic pain of toe of left foot  Chronic pain of toe of right foot  Nail fungus      Toenails are debrided in length and thickness to avoid infection and for pain relief           Coby Maxwell, MARIO  [address] [place] [address]

## 2024-01-11 ENCOUNTER — PROCEDURE VISIT (OUTPATIENT)
Dept: PODIATRY | Facility: CLINIC | Age: 69
End: 2024-01-11
Payer: MEDICARE

## 2024-01-11 DIAGNOSIS — M79.675 CHRONIC PAIN OF TOE OF LEFT FOOT: ICD-10-CM

## 2024-01-11 DIAGNOSIS — G89.29 CHRONIC PAIN OF TOE OF RIGHT FOOT: ICD-10-CM

## 2024-01-11 DIAGNOSIS — G89.29 CHRONIC PAIN OF TOE OF LEFT FOOT: ICD-10-CM

## 2024-01-11 DIAGNOSIS — B35.1 NAIL FUNGUS: Primary | ICD-10-CM

## 2024-01-11 DIAGNOSIS — M79.674 CHRONIC PAIN OF TOE OF RIGHT FOOT: ICD-10-CM

## 2024-01-11 DIAGNOSIS — E11.9 DIABETES MELLITUS TYPE 2 WITHOUT RETINOPATHY (MULTI): ICD-10-CM

## 2024-01-11 PROCEDURE — 11721 DEBRIDE NAIL 6 OR MORE: CPT | Performed by: PODIATRIST

## 2024-01-11 NOTE — PROGRESS NOTES
/History Of Present Illness  Lydia Lowe is a 68 y.o. female presenting for diabetic nail care. Patient complains with painful elongated nails.     Past Medical History  She has a past medical history of Allergic (1980s?), Asthma (2000?), Cataract (2022), Diabetes mellitus (CMS/[facility]) (2016), Eczema (2018), Encounter for general adult medical examination without abnormal findings (11/14/2012), Encounter for gynecological examination (general) (routine) without abnormal findings, ESR raised (10/19/2023), Gastro-esophageal reflux disease with esophagitis, without bleeding (07/18/2014), GERD (gastroesophageal reflux disease) (1999), Hypertension (2001?), Impaired fasting glucose, Other conditions influencing health status, Other conditions influencing health status (12/12/2012), Other specified noninflammatory disorders of vulva and perineum (08/30/2021), Personal history of diseases of the skin and subcutaneous tissue (04/12/2013), Personal history of other diseases of the circulatory system, Personal history of other diseases of the respiratory system, Personal history of other diseases of the respiratory system, Personal history of other diseases of the respiratory system, Urinary tract infection (Over 5 years ago?), Varicella (1958?), and Visual impairment (1963?).    Surgical History  She has a past surgical history that includes Colonoscopy (11/27/2012); Other surgical history (11/27/2012); Dilation and curettage of uterus (12/07/2012); Other surgical history (08/18/2017); Eye surgery (1993); and [place] tooth extraction (1994?).     Social History  She reports that she has never smoked. She has never used smokeless tobacco. She reports current alcohol use. She reports that she does not use drugs.    Family History  Family History   Problem Relation Name Age of Onset    Breast cancer Mother Mom? 81    Asthma Mother Mom?     Hypertension Mother Mom?     Brain cancer Father Dad?     Diabetes Father Dad?      Colon cancer Father Dad?     COPD Father Dad?     Hypertension Father Dad?     Heart attack Sister      Stroke Sister      Coronary artery disease Brother          CABG    Stroke Brother      Hypertension Sibling      Stroke Sister Sheridan         Allergies  Carbapenems, Penicillins, Sulfa (sulfonamide antibiotics), and Nitrofurantoin monohyd/m-cryst    Medications  Current Outpatient Medications   Medication Sig Dispense Refill    albuterol 90 mcg/actuation inhaler Inhale 2 puffs every 6 hours if needed for wheezing. 2 puffs every 4-6 hrs prn.      isma cit-mag-D3-Zn--man-bor (Citracal-D3 Plus Magnesium) 250- mg-mg-unit tablet Take 1 tablet by mouth once daily.      cholecalciferol (Vitamin D3) 25 MCG (1000 UT) tablet Take 1 tablet (25 mcg) by mouth once daily.      cinnamon 500 mg capsule Take 1,000 capsules (500,000 mg) by mouth once daily.      clobetasol (Temovate) 0.05 % cream Apply 1 Application topically.      esomeprazole (NexIUM) 40 mg DR capsule Take 1 capsule (40 mg) by mouth once daily.      fluticasone (Cutivate) 0.05 % cream Apply topically.      loratadine (Claritin Liqui-Gel) 10 mg capsule Take 1 capsule by mouth if needed.      losartan (Cozaar) 50 mg tablet Take 1 tablet (50 mg) by mouth once daily. 90 tablet 3    mometasone (Nasonex) 50 mcg/actuation nasal spray Administer 2 sprays into each nostril once daily.      mometasone-formoterol (Dulera) 200-5 mcg/actuation inhaler INHALE 2 PUFFS AT 12 HOUR INTERVALS (MORNING AND EVENING) 39 g 3    montelukast (Singulair) 10 mg tablet Take 1 tablet (10 mg) by mouth once daily. 90 tablet 3    multivit-min/iron/folic/lutein (CENTRUM SILVER WOMEN ORAL) Take 1 tablet by mouth once daily.      olopatadine (Patanol) 0.1 % ophthalmic solution Administer 1 drop into both eyes 2 times a day. PRN      omega-3 fatty acids-fish oil 300-1,000 mg capsule Take 1 capsule (1,000 mg) by mouth once daily.       No current facility-administered medications for this  visit.       Review of Systems    REVIEW OF SYSTEMS  GENERAL:  Negative for malaise, significant weight loss, fever  CARDIOVASCULAR: leg swelling   MUSCULOSKELETAL:  Negative for joint pain or swelling, back pain, and muscle pain.  SKIN:  Negative for lesions, rash, and itching  PSYCH:  Negative for sleep disturbance, mood disorder and recent psychosocial stressors  NEURO: Negative, denies any burning, tingling or numbness     Objective:   Vasc: DP and PT pulses are palpable bilateral.  CFT is less than 3 seconds bilateral.  Skin temperature is warm to cool proximal to distal bilateral.  No edema    Neuro:  Light touch is intact to the foot bilateral.   There is no clonus noted.  The hallux is downgoing bilateral.      Derm: Nails 1-5 bilateral are thickened, elongated and crumbly with subungual debris. Skin is supple with normal texture and turgor noted.  Webspaces are clean, dry and intact bilateral.  There are no hyperkeratoses, ulcerations, verruca or other lesions noted.      Ortho: Muscle strength is 5/5 for all pedal groups tested.  Ankle joint, subtalar joint, 1st MPJ and lesser MPJ ROM is full and without pain or crepitus.  The foot type is rectus bilateral off weight bearing.  There are no structural deformities noted.    Assessment/Plan     DM  Painful nail mycosis        Toenails are debrided in length and thickness to avoid infection and for pain relief           Coby Reynolds-Aquiles, MARIO  [address] [place] [address]

## 2024-02-02 ENCOUNTER — PATIENT MESSAGE (OUTPATIENT)
Dept: PRIMARY CARE | Facility: CLINIC | Age: 69
End: 2024-02-02
Payer: MEDICARE

## 2024-02-06 DIAGNOSIS — J45.20 MILD INTERMITTENT ASTHMA WITHOUT COMPLICATION (HHS-HCC): Primary | ICD-10-CM

## 2024-02-06 DIAGNOSIS — J45.20 MILD INTERMITTENT ASTHMA WITHOUT COMPLICATION (HHS-HCC): ICD-10-CM

## 2024-02-06 RX ORDER — ALBUTEROL SULFATE 90 UG/1
AEROSOL, METERED RESPIRATORY (INHALATION)
Qty: 54 G | Refills: 3 | Status: SHIPPED | OUTPATIENT
Start: 2024-02-06 | End: 2024-02-06 | Stop reason: SDUPTHER

## 2024-02-06 RX ORDER — ALBUTEROL SULFATE 90 UG/1
AEROSOL, METERED RESPIRATORY (INHALATION)
Qty: 54 G | Refills: 3 | Status: CANCELLED | OUTPATIENT
Start: 2024-02-06

## 2024-02-06 RX ORDER — ALBUTEROL SULFATE 90 UG/1
AEROSOL, METERED RESPIRATORY (INHALATION)
Qty: 54 G | Refills: 3 | Status: SHIPPED | OUTPATIENT
Start: 2024-02-06

## 2024-03-14 ENCOUNTER — PROCEDURE VISIT (OUTPATIENT)
Dept: PODIATRY | Facility: CLINIC | Age: 69
End: 2024-03-14
Payer: MEDICARE

## 2024-03-14 DIAGNOSIS — E11.9 DIABETES MELLITUS TYPE 2 WITHOUT RETINOPATHY (MULTI): Primary | ICD-10-CM

## 2024-03-14 DIAGNOSIS — B35.1 NAIL FUNGUS: ICD-10-CM

## 2024-03-14 DIAGNOSIS — M79.674 CHRONIC PAIN OF TOE OF RIGHT FOOT: ICD-10-CM

## 2024-03-14 DIAGNOSIS — M79.675 CHRONIC PAIN OF TOE OF LEFT FOOT: ICD-10-CM

## 2024-03-14 DIAGNOSIS — G89.29 CHRONIC PAIN OF TOE OF LEFT FOOT: ICD-10-CM

## 2024-03-14 DIAGNOSIS — G89.29 CHRONIC PAIN OF TOE OF RIGHT FOOT: ICD-10-CM

## 2024-03-14 PROCEDURE — 11721 DEBRIDE NAIL 6 OR MORE: CPT | Performed by: PODIATRIST

## 2024-03-14 NOTE — PROGRESS NOTES
/History Of Present Illness  Lydia Lowe is a 68 y.o. female presenting for diabetic nail care. Patient complains with painful elongated nails.     Past Medical History  She has a past medical history of Allergic (1980s?), Asthma (2000?), Cataract (2022), Diabetes mellitus (CMS/[facility]) (2016), Eczema (2018), Encounter for general adult medical examination without abnormal findings (11/14/2012), Encounter for gynecological examination (general) (routine) without abnormal findings, ESR raised (10/19/2023), Gastro-esophageal reflux disease with esophagitis, without bleeding (07/18/2014), GERD (gastroesophageal reflux disease) (1999), Hypertension (2001?), Impaired fasting glucose, Other conditions influencing health status, Other conditions influencing health status (12/12/2012), Other specified noninflammatory disorders of vulva and perineum (08/30/2021), Personal history of diseases of the skin and subcutaneous tissue (04/12/2013), Personal history of other diseases of the circulatory system, Personal history of other diseases of the respiratory system, Personal history of other diseases of the respiratory system, Personal history of other diseases of the respiratory system, Urinary tract infection (Over 5 years ago?), Varicella (1958?), and Visual impairment (1963?).    Surgical History  She has a past surgical history that includes Colonoscopy (11/27/2012); Other surgical history (11/27/2012); Dilation and curettage of uterus (12/07/2012); Other surgical history (08/18/2017); Eye surgery (1993); and [place] tooth extraction (1994?).     Social History  She reports that she has never smoked. She has never used smokeless tobacco. She reports current alcohol use. She reports that she does not use drugs.    Family History  Family History   Problem Relation Name Age of Onset    Breast cancer Mother Mom? 81    Asthma Mother Mom?     Hypertension Mother Mom?     Brain cancer Father Dad?     Diabetes Father Dad?      Colon cancer Father Dad?     COPD Father Dad?     Hypertension Father Dad?     Heart attack Sister      Stroke Sister      Coronary artery disease Brother          CABG    Stroke Brother      Hypertension Sibling      Stroke Sister Sheridan         Allergies  Carbapenems, Penicillins, Sulfa (sulfonamide antibiotics), and Nitrofurantoin monohyd/m-cryst    Medications  Current Outpatient Medications   Medication Sig Dispense Refill    albuterol 90 mcg/actuation inhaler 2 puffs every 4-6 hrs prn. 54 g 3    isma cit-mag-D3-Zn--man-bor (Citracal-D3 Plus Magnesium) 250- mg-mg-unit tablet Take 1 tablet by mouth once daily.      cholecalciferol (Vitamin D3) 25 MCG (1000 UT) tablet Take 1 tablet (25 mcg) by mouth once daily.      cinnamon 500 mg capsule Take 1,000 capsules (500,000 mg) by mouth once daily.      esomeprazole (NexIUM) 40 mg DR capsule Take 1 capsule (40 mg) by mouth once daily.      fluticasone (Cutivate) 0.05 % cream Apply topically.      loratadine (Claritin Liqui-Gel) 10 mg capsule Take 1 capsule by mouth if needed.      losartan (Cozaar) 50 mg tablet Take 1 tablet (50 mg) by mouth once daily. 90 tablet 3    mometasone (Nasonex) 50 mcg/actuation nasal spray Administer 2 sprays into each nostril once daily.      mometasone-formoterol (Dulera) 200-5 mcg/actuation inhaler INHALE 2 PUFFS AT 12 HOUR INTERVALS (MORNING AND EVENING) 39 g 3    montelukast (Singulair) 10 mg tablet Take 1 tablet (10 mg) by mouth once daily. 90 tablet 3    multivit-min/iron/folic/lutein (CENTRUM SILVER WOMEN ORAL) Take 1 tablet by mouth once daily.      olopatadine (Patanol) 0.1 % ophthalmic solution Administer 1 drop into both eyes 2 times a day. PRN      omega-3 fatty acids-fish oil 300-1,000 mg capsule Take 1 capsule (1,000 mg) by mouth once daily.      clobetasol (Temovate) 0.05 % cream Apply 1 Application topically.       No current facility-administered medications for this visit.       Review of Systems    REVIEW OF  SYSTEMS  GENERAL:  Negative for malaise, significant weight loss, fever  CARDIOVASCULAR: leg swelling   MUSCULOSKELETAL:  Negative for joint pain or swelling, back pain, and muscle pain.  SKIN:  Negative for lesions, rash, and itching  PSYCH:  Negative for sleep disturbance, mood disorder and recent psychosocial stressors  NEURO: Negative, denies any burning, tingling or numbness     Objective:   Vasc: DP and PT pulses are palpable bilateral.  CFT is less than 3 seconds bilateral.  Skin temperature is warm to cool proximal to distal bilateral.  No edema    Neuro:  Light touch is intact to the foot bilateral.   There is no clonus noted.  The hallux is downgoing bilateral.  Vibration intact     Derm: Nails 1-5 bilateral are thickened, elongated and crumbly with subungual debris. Skin is supple with normal texture and turgor noted.  Webspaces are clean, dry and intact bilateral.  There are no hyperkeratoses, ulcerations, verruca or other lesions noted.      Ortho: Muscle strength is 5/5 for all pedal groups tested.  Ankle joint, subtalar joint, 1st MPJ and lesser MPJ ROM is full and without pain or crepitus.  The foot type is rectus bilateral off weight bearing.  There are no structural deformities noted.    Assessment/Plan     DM  Painful nail mycosis        Toenails are debrided in length and thickness to avoid infection and for pain relief           Coby Reynolds-Aquiles, MARIO  [address] [place] [address]     [Initial Evaluation] : an initial evaluation for [FreeTextEntry2] : sinus congestion for the past couple of months.  Patient states his level of severity is a level 7 out of 10 and it occurs constant.  Patient states nothing helps to improve or worsens his sinus congestion for the past couple of months.

## 2024-03-18 DIAGNOSIS — J45.41 MODERATE PERSISTENT ALLERGIC ASTHMA WITH ACUTE EXACERBATION (HHS-HCC): ICD-10-CM

## 2024-03-18 RX ORDER — MONTELUKAST SODIUM 10 MG/1
10 TABLET ORAL DAILY
Qty: 90 TABLET | Refills: 3 | Status: SHIPPED | OUTPATIENT
Start: 2024-03-18

## 2024-03-27 DIAGNOSIS — J45.41 MODERATE PERSISTENT ALLERGIC ASTHMA WITH ACUTE EXACERBATION (HHS-HCC): ICD-10-CM

## 2024-03-27 RX ORDER — MOMETASONE FUROATE AND FORMOTEROL FUMARATE DIHYDRATE 200; 5 UG/1; UG/1
AEROSOL RESPIRATORY (INHALATION)
Qty: 39 G | Refills: 3 | Status: SHIPPED | OUTPATIENT
Start: 2024-03-27

## 2024-04-09 ENCOUNTER — LAB (OUTPATIENT)
Dept: LAB | Facility: LAB | Age: 69
End: 2024-04-09
Payer: MEDICARE

## 2024-04-09 DIAGNOSIS — E55.9 VITAMIN D DEFICIENCY: ICD-10-CM

## 2024-04-09 DIAGNOSIS — E78.49 OTHER HYPERLIPIDEMIA: ICD-10-CM

## 2024-04-09 DIAGNOSIS — I10 BENIGN ESSENTIAL HYPERTENSION: ICD-10-CM

## 2024-04-09 DIAGNOSIS — E11.9 DIABETES MELLITUS TYPE 2 WITHOUT RETINOPATHY (MULTI): ICD-10-CM

## 2024-04-09 LAB
25(OH)D3 SERPL-MCNC: 46 NG/ML (ref 30–100)
ALBUMIN SERPL BCP-MCNC: 4.3 G/DL (ref 3.4–5)
ALP SERPL-CCNC: 68 U/L (ref 33–136)
ALT SERPL W P-5'-P-CCNC: 16 U/L (ref 7–45)
ANION GAP SERPL CALC-SCNC: 9 MMOL/L (ref 10–20)
AST SERPL W P-5'-P-CCNC: 15 U/L (ref 9–39)
BASOPHILS # BLD AUTO: 0.03 X10*3/UL (ref 0–0.1)
BASOPHILS NFR BLD AUTO: 0.5 %
BILIRUB SERPL-MCNC: 0.5 MG/DL (ref 0–1.2)
BUN SERPL-MCNC: 10 MG/DL (ref 6–23)
CALCIUM SERPL-MCNC: 9.8 MG/DL (ref 8.6–10.3)
CHLORIDE SERPL-SCNC: 105 MMOL/L (ref 98–107)
CHOLEST SERPL-MCNC: 155 MG/DL (ref 0–199)
CHOLESTEROL/HDL RATIO: 2.4
CO2 SERPL-SCNC: 31 MMOL/L (ref 21–32)
CREAT SERPL-MCNC: 0.62 MG/DL (ref 0.5–1.05)
CREAT UR-MCNC: 49 MG/DL (ref 20–320)
EGFRCR SERPLBLD CKD-EPI 2021: >90 ML/MIN/1.73M*2
EOSINOPHIL # BLD AUTO: 0.13 X10*3/UL (ref 0–0.7)
EOSINOPHIL NFR BLD AUTO: 2 %
ERYTHROCYTE [DISTWIDTH] IN BLOOD BY AUTOMATED COUNT: 13.7 % (ref 11.5–14.5)
EST. AVERAGE GLUCOSE BLD GHB EST-MCNC: 100 MG/DL
GLUCOSE SERPL-MCNC: 89 MG/DL (ref 74–99)
HBA1C MFR BLD: 5.1 %
HCT VFR BLD AUTO: 39.3 % (ref 36–46)
HDLC SERPL-MCNC: 64.4 MG/DL
HGB BLD-MCNC: 13 G/DL (ref 12–16)
IMM GRANULOCYTES # BLD AUTO: 0.02 X10*3/UL (ref 0–0.7)
IMM GRANULOCYTES NFR BLD AUTO: 0.3 % (ref 0–0.9)
LDLC SERPL CALC-MCNC: 76 MG/DL
LYMPHOCYTES # BLD AUTO: 1.32 X10*3/UL (ref 1.2–4.8)
LYMPHOCYTES NFR BLD AUTO: 20.5 %
MCH RBC QN AUTO: 32.1 PG (ref 26–34)
MCHC RBC AUTO-ENTMCNC: 33.1 G/DL (ref 32–36)
MCV RBC AUTO: 97 FL (ref 80–100)
MICROALBUMIN UR-MCNC: 15.7 MG/L
MICROALBUMIN/CREAT UR: 32 UG/MG CREAT
MONOCYTES # BLD AUTO: 0.4 X10*3/UL (ref 0.1–1)
MONOCYTES NFR BLD AUTO: 6.2 %
NEUTROPHILS # BLD AUTO: 4.53 X10*3/UL (ref 1.2–7.7)
NEUTROPHILS NFR BLD AUTO: 70.5 %
NON HDL CHOLESTEROL: 91 MG/DL (ref 0–149)
NRBC BLD-RTO: 0 /100 WBCS (ref 0–0)
PLATELET # BLD AUTO: 181 X10*3/UL (ref 150–450)
POTASSIUM SERPL-SCNC: 4.2 MMOL/L (ref 3.5–5.3)
PROT SERPL-MCNC: 6.5 G/DL (ref 6.4–8.2)
RBC # BLD AUTO: 4.05 X10*6/UL (ref 4–5.2)
SODIUM SERPL-SCNC: 141 MMOL/L (ref 136–145)
TRIGL SERPL-MCNC: 72 MG/DL (ref 0–149)
TSH SERPL-ACNC: 3.13 MIU/L (ref 0.44–3.98)
VLDL: 14 MG/DL (ref 0–40)
WBC # BLD AUTO: 6.4 X10*3/UL (ref 4.4–11.3)

## 2024-04-09 PROCEDURE — 80053 COMPREHEN METABOLIC PANEL: CPT

## 2024-04-09 PROCEDURE — 82306 VITAMIN D 25 HYDROXY: CPT

## 2024-04-09 PROCEDURE — 84443 ASSAY THYROID STIM HORMONE: CPT

## 2024-04-09 PROCEDURE — 36415 COLL VENOUS BLD VENIPUNCTURE: CPT

## 2024-04-09 PROCEDURE — 83036 HEMOGLOBIN GLYCOSYLATED A1C: CPT

## 2024-04-09 PROCEDURE — 80061 LIPID PANEL: CPT

## 2024-04-09 PROCEDURE — 82570 ASSAY OF URINE CREATININE: CPT

## 2024-04-09 PROCEDURE — 82043 UR ALBUMIN QUANTITATIVE: CPT

## 2024-04-09 PROCEDURE — 85025 COMPLETE CBC W/AUTO DIFF WBC: CPT

## 2024-04-09 NOTE — RESULT ENCOUNTER NOTE
The protein loss is not bad but the ratio is a little high due to the amount urine concentration they collected   No change in medication this time

## 2024-04-09 NOTE — RESULT ENCOUNTER NOTE
Hi- I am covering for Dr Proctor  The A1c is good  value   The Vitamin D is adequate   Thyroid is normal range '  Cholesterol is good value   Rest of the labs are good range

## 2024-04-19 ENCOUNTER — OFFICE VISIT (OUTPATIENT)
Dept: PRIMARY CARE | Facility: CLINIC | Age: 69
End: 2024-04-19
Payer: MEDICARE

## 2024-04-19 VITALS
HEIGHT: 58 IN | HEART RATE: 78 BPM | BODY MASS INDEX: 26.91 KG/M2 | WEIGHT: 128.2 LBS | DIASTOLIC BLOOD PRESSURE: 79 MMHG | OXYGEN SATURATION: 97 % | SYSTOLIC BLOOD PRESSURE: 131 MMHG

## 2024-04-19 DIAGNOSIS — Z00.00 MEDICARE ANNUAL WELLNESS VISIT, SUBSEQUENT: Primary | ICD-10-CM

## 2024-04-19 DIAGNOSIS — Z00.00 ROUTINE GENERAL MEDICAL EXAMINATION AT HEALTH CARE FACILITY: ICD-10-CM

## 2024-04-19 DIAGNOSIS — I10 BENIGN ESSENTIAL HYPERTENSION: ICD-10-CM

## 2024-04-19 DIAGNOSIS — H35.3222 EXUDATIVE AGE-RELATED MACULAR DEGENERATION, LEFT EYE, WITH INACTIVE CHOROIDAL NEOVASCULARIZATION (MULTI): ICD-10-CM

## 2024-04-19 DIAGNOSIS — E66.3 OVERWEIGHT: ICD-10-CM

## 2024-04-19 DIAGNOSIS — E11.9 DIABETES MELLITUS TYPE 2 WITHOUT RETINOPATHY (MULTI): ICD-10-CM

## 2024-04-19 DIAGNOSIS — Z12.11 SCREENING FOR COLON CANCER: ICD-10-CM

## 2024-04-19 PROBLEM — J02.9 SORE THROAT: Status: RESOLVED | Noted: 2023-06-07 | Resolved: 2024-04-19

## 2024-04-19 PROBLEM — Q25.79 PULMONARY ARTERY ABNORMALITY (HHS-HCC): Status: RESOLVED | Noted: 2023-06-07 | Resolved: 2024-04-19

## 2024-04-19 PROCEDURE — 1157F ADVNC CARE PLAN IN RCRD: CPT | Performed by: INTERNAL MEDICINE

## 2024-04-19 PROCEDURE — G0439 PPPS, SUBSEQ VISIT: HCPCS | Performed by: INTERNAL MEDICINE

## 2024-04-19 PROCEDURE — 99214 OFFICE O/P EST MOD 30 MIN: CPT | Performed by: INTERNAL MEDICINE

## 2024-04-19 PROCEDURE — 1123F ACP DISCUSS/DSCN MKR DOCD: CPT | Performed by: INTERNAL MEDICINE

## 2024-04-19 PROCEDURE — 1159F MED LIST DOCD IN RCRD: CPT | Performed by: INTERNAL MEDICINE

## 2024-04-19 PROCEDURE — 3044F HG A1C LEVEL LT 7.0%: CPT | Performed by: INTERNAL MEDICINE

## 2024-04-19 PROCEDURE — 3075F SYST BP GE 130 - 139MM HG: CPT | Performed by: INTERNAL MEDICINE

## 2024-04-19 PROCEDURE — 4010F ACE/ARB THERAPY RXD/TAKEN: CPT | Performed by: INTERNAL MEDICINE

## 2024-04-19 PROCEDURE — 1036F TOBACCO NON-USER: CPT | Performed by: INTERNAL MEDICINE

## 2024-04-19 PROCEDURE — 3078F DIAST BP <80 MM HG: CPT | Performed by: INTERNAL MEDICINE

## 2024-04-19 PROCEDURE — 1170F FXNL STATUS ASSESSED: CPT | Performed by: INTERNAL MEDICINE

## 2024-04-19 PROCEDURE — 3048F LDL-C <100 MG/DL: CPT | Performed by: INTERNAL MEDICINE

## 2024-04-19 PROCEDURE — G0446 INTENS BEHAVE THER CARDIO DX: HCPCS | Performed by: INTERNAL MEDICINE

## 2024-04-19 PROCEDURE — 1160F RVW MEDS BY RX/DR IN RCRD: CPT | Performed by: INTERNAL MEDICINE

## 2024-04-19 PROCEDURE — 3061F NEG MICROALBUMINURIA REV: CPT | Performed by: INTERNAL MEDICINE

## 2024-04-19 PROCEDURE — 1158F ADVNC CARE PLAN TLK DOCD: CPT | Performed by: INTERNAL MEDICINE

## 2024-04-19 RX ORDER — LOSARTAN POTASSIUM 50 MG/1
50 TABLET ORAL DAILY
Qty: 90 TABLET | Refills: 3 | Status: SHIPPED | OUTPATIENT
Start: 2024-04-19

## 2024-04-19 ASSESSMENT — ACTIVITIES OF DAILY LIVING (ADL)
BATHING: INDEPENDENT
GROCERY_SHOPPING: INDEPENDENT
DRESSING: INDEPENDENT
MANAGING_FINANCES: INDEPENDENT
TAKING_MEDICATION: INDEPENDENT
DOING_HOUSEWORK: INDEPENDENT

## 2024-04-19 ASSESSMENT — PATIENT HEALTH QUESTIONNAIRE - PHQ9
2. FEELING DOWN, DEPRESSED OR HOPELESS: NOT AT ALL
SUM OF ALL RESPONSES TO PHQ9 QUESTIONS 1 AND 2: 0
1. LITTLE INTEREST OR PLEASURE IN DOING THINGS: NOT AT ALL

## 2024-04-19 ASSESSMENT — ENCOUNTER SYMPTOMS
RESPIRATORY NEGATIVE: 1
CARDIOVASCULAR NEGATIVE: 1
EYES NEGATIVE: 1
PSYCHIATRIC NEGATIVE: 1
GASTROINTESTINAL NEGATIVE: 1
CONSTITUTIONAL NEGATIVE: 1
HEMATOLOGIC/LYMPHATIC NEGATIVE: 1
NEUROLOGICAL NEGATIVE: 1

## 2024-04-19 NOTE — ASSESSMENT & PLAN NOTE
A1c at goal.  Continue diet and exercise, doing well without medication and just with lifestyle modification.

## 2024-04-19 NOTE — PROGRESS NOTES
"Subjective   Patient ID: Lyida oLwe is a 68 y.o. female who presents for Medicare Annual Wellness Visit Subsequent and 6 month follow up .    Here for MCR and follow up.  she has HTN,mild intermittent asthma,allergic rhinitis .her diabetes is well controlled with diet only.  She sees Dr Palacio and Dr Keys for her eyes,has h/o retinal hemorrhage R eye,not related to diabetes.she has bilateral cataracts.  She is here to go over her lab result.  She takes NSAIDs twice a week for aches and pain.         Review of Systems   Constitutional: Negative.    HENT: Negative.     Eyes: Negative.    Respiratory: Negative.     Cardiovascular: Negative.    Gastrointestinal: Negative.    Genitourinary: Negative.    Neurological: Negative.    Hematological: Negative.    Psychiatric/Behavioral: Negative.         Objective   /79 (BP Location: Left arm, Patient Position: Sitting, BP Cuff Size: Adult)   Pulse 78   Ht 1.473 m (4' 10\")   Wt 58.2 kg (128 lb 3.2 oz)   SpO2 97%   BMI 26.79 kg/m²     Physical Exam  Vitals reviewed.   Constitutional:       Appearance: Normal appearance.   HENT:      Head: Normocephalic and atraumatic.      Left Ear: Ear canal and external ear normal.      Mouth/Throat:      Pharynx: No oropharyngeal exudate or posterior oropharyngeal erythema.   Eyes:      Extraocular Movements: Extraocular movements intact.      Pupils: Pupils are equal, round, and reactive to light.   Cardiovascular:      Rate and Rhythm: Normal rate and regular rhythm.      Pulses: Normal pulses.      Heart sounds: Normal heart sounds. No murmur heard.  Pulmonary:      Effort: Pulmonary effort is normal.      Breath sounds: Normal breath sounds.   Abdominal:      General: Abdomen is flat. Bowel sounds are normal.      Palpations: Abdomen is soft.   Musculoskeletal:         General: Normal range of motion.      Cervical back: Normal range of motion and neck supple.   Neurological:      General: No focal deficit present.      " Mental Status: She is alert and oriented to person, place, and time.   Psychiatric:         Mood and Affect: Mood normal.         Assessment/Plan   Problem List Items Addressed This Visit             ICD-10-CM    Benign essential hypertension I10     Stable on current medication.  15 minutes were spent in the discussion for cardiovascular disease , patient ASCVD risk is 15.3% which is moderate, last cardiac calcium score was 0, patient was seen by cardiologist in the past..  Keep BMI ,LDL , A1c and BP at goal.         Relevant Medications    losartan (Cozaar) 50 mg tablet    Diabetes mellitus type 2 without retinopathy (Multi) E11.9     A1c at goal.  Continue diet and exercise, doing well without medication and just with lifestyle modification.         Overweight E66.3     Continue diet and exercise.         Medicare annual wellness visit, subsequent - Primary Z00.00     I recommend to the Prevnar 20 patient wants to see if it is covered first and she will make a nurse visit,  She will be due for colonoscopy due to family history this summer, she will contact Dr. Cotton for appointment         Exudative age-related macular degeneration, left eye, with inactive choroidal neovascularization (Multi) H35.3222     Seen by ophthalmologist, kush.          Other Visit Diagnoses         Codes    Routine general medical examination at health care facility     Z00.00    Screening for colon cancer     Z12.11    Relevant Orders    Colonoscopy Screening; Average Risk Patient

## 2024-04-19 NOTE — ASSESSMENT & PLAN NOTE
Stable on current medication.  15 minutes were spent in the discussion for cardiovascular disease , patient ASCVD risk is 15.3% which is moderate, last cardiac calcium score was 0, patient was seen by cardiologist in the past..  Keep BMI ,LDL , A1c and BP at goal.

## 2024-04-19 NOTE — ASSESSMENT & PLAN NOTE
I recommend to the Prevnar 20 patient wants to see if it is covered first and she will make a nurse visit,  She will be due for colonoscopy due to family history this summer, she will contact Dr. Cotton for appointment

## 2024-04-23 ENCOUNTER — HOSPITAL ENCOUNTER (OUTPATIENT)
Dept: RADIOLOGY | Facility: CLINIC | Age: 69
Discharge: HOME | End: 2024-04-23
Payer: MEDICARE

## 2024-04-23 VITALS — WEIGHT: 128.31 LBS | BODY MASS INDEX: 26.93 KG/M2 | HEIGHT: 58 IN

## 2024-04-23 DIAGNOSIS — N64.89 BREAST ASYMMETRY: ICD-10-CM

## 2024-04-23 PROCEDURE — 77065 DX MAMMO INCL CAD UNI: CPT | Mod: RIGHT SIDE | Performed by: RADIOLOGY

## 2024-04-23 PROCEDURE — G0279 TOMOSYNTHESIS, MAMMO: HCPCS | Mod: RIGHT SIDE | Performed by: RADIOLOGY

## 2024-04-23 PROCEDURE — 77061 BREAST TOMOSYNTHESIS UNI: CPT | Mod: RT

## 2024-05-16 ENCOUNTER — PROCEDURE VISIT (OUTPATIENT)
Dept: PODIATRY | Facility: CLINIC | Age: 69
End: 2024-05-16
Payer: MEDICARE

## 2024-05-16 DIAGNOSIS — B35.1 NAIL FUNGUS: Primary | ICD-10-CM

## 2024-05-16 DIAGNOSIS — E11.9 DIABETES MELLITUS TYPE 2 WITHOUT RETINOPATHY (MULTI): ICD-10-CM

## 2024-05-16 DIAGNOSIS — G89.29 CHRONIC PAIN OF TOE OF RIGHT FOOT: ICD-10-CM

## 2024-05-16 DIAGNOSIS — G89.29 CHRONIC PAIN OF TOE OF LEFT FOOT: ICD-10-CM

## 2024-05-16 DIAGNOSIS — M79.674 CHRONIC PAIN OF TOE OF RIGHT FOOT: ICD-10-CM

## 2024-05-16 DIAGNOSIS — M79.675 CHRONIC PAIN OF TOE OF LEFT FOOT: ICD-10-CM

## 2024-05-16 PROCEDURE — 11721 DEBRIDE NAIL 6 OR MORE: CPT | Performed by: PODIATRIST

## 2024-05-16 NOTE — PROGRESS NOTES
/History Of Present Illness  Lydia Lowe is a 68 y.o. female presenting for diabetic nail care. Patient complains with painful elongated nails. PCP Dr Proctor, last visit 4/2024     Past Medical History  She has a past medical history of Allergic (1980s?), Asthma ([facility]) (2000?), Cataract (2022), Diabetes mellitus (Multi) (2016), Eczema (2018), Encounter for general adult medical examination without abnormal findings (11/14/2012), Encounter for gynecological examination (general) (routine) without abnormal findings, ESR raised (10/19/2023), Gastro-esophageal reflux disease with esophagitis, without bleeding (07/18/2014), GERD (gastroesophageal reflux disease) (1999), Hypertension (2001?), Impaired fasting glucose, Other conditions influencing health status, Other conditions influencing health status (12/12/2012), Other specified noninflammatory disorders of vulva and perineum (08/30/2021), Personal history of diseases of the skin and subcutaneous tissue (04/12/2013), Personal history of other diseases of the circulatory system, Personal history of other diseases of the respiratory system, Personal history of other diseases of the respiratory system, Personal history of other diseases of the respiratory system, Urinary tract infection (Over 5 years ago?), Varicella (1958?), and Visual impairment (1963?).    Surgical History  She has a past surgical history that includes Colonoscopy (11/27/2012); Other surgical history (11/27/2012); Dilation and curettage of uterus (12/07/2012); Other surgical history (08/18/2017); Eye surgery (1993); and [place] tooth extraction (1994?).     Social History  She reports that she has never smoked. She has never used smokeless tobacco. She reports current alcohol use. She reports that she does not use drugs.    Family History  Family History   Problem Relation Name Age of Onset    Breast cancer Mother Mom? 81    Asthma Mother Mom?     Hypertension Mother Mom?     Brain cancer  Father Dad?     Diabetes Father Dad?     Colon cancer Father Dad?     COPD Father Dad?     Hypertension Father Dad?     Heart attack Sister      Stroke Sister      Coronary artery disease Brother          CABG    Stroke Brother      Hypertension Sibling      Stroke Sister Sheridan         Allergies  Carbapenems, Penicillins, Sulfa (sulfonamide antibiotics), and Nitrofurantoin monohyd/m-cryst    Medications  Current Outpatient Medications   Medication Sig Dispense Refill    albuterol 90 mcg/actuation inhaler 2 puffs every 4-6 hrs prn. 54 g 3    isma cit-mag-D3-Zn--man-bor (Citracal-D3 Plus Magnesium) 250- mg-mg-unit tablet Take 1 tablet by mouth once daily.      cholecalciferol (Vitamin D3) 25 MCG (1000 UT) tablet Take 1 tablet (25 mcg) by mouth once daily.      cinnamon 500 mg capsule Take 1,000 capsules (500,000 mg) by mouth once daily.      esomeprazole (NexIUM) 40 mg DR capsule Take 1 capsule (40 mg) by mouth once daily.      fluticasone (Cutivate) 0.05 % cream Apply topically.      loratadine (Claritin Liqui-Gel) 10 mg capsule Take 1 capsule by mouth if needed.      losartan (Cozaar) 50 mg tablet Take 1 tablet (50 mg) by mouth once daily. 90 tablet 3    mometasone (Nasonex) 50 mcg/actuation nasal spray Administer 2 sprays into each nostril once daily.      mometasone-formoterol (Dulera) 200-5 mcg/actuation inhaler INHALE 2 PUFFS AT 12 HOUR INTERVALS (MORNING AND EVENING) 39 g 3    montelukast (Singulair) 10 mg tablet Take 1 tablet (10 mg) by mouth once daily. 90 tablet 3    multivit-min/iron/folic/lutein (CENTRUM SILVER WOMEN ORAL) Take 1 tablet by mouth once daily.      olopatadine (Patanol) 0.1 % ophthalmic solution Administer 1 drop into both eyes 2 times a day. PRN      omega-3 fatty acids-fish oil 300-1,000 mg capsule Take 1 capsule (1,000 mg) by mouth once daily.       No current facility-administered medications for this visit.       Review of Systems    REVIEW OF SYSTEMS  GENERAL:  Negative for  malaise, significant weight loss, fever  CARDIOVASCULAR: leg swelling   MUSCULOSKELETAL:  Negative for joint pain or swelling, back pain, and muscle pain.  SKIN:  Negative for lesions, rash, and itching  PSYCH:  Negative for sleep disturbance, mood disorder and recent psychosocial stressors  NEURO: Negative, denies any burning, tingling or numbness     Objective: last A1c is 5.2  Vasc: DP and PT pulses are palpable bilateral.  CFT is less than 3 seconds bilateral.  Skin temperature is warm to cool proximal to distal bilateral.  No edema    Neuro:  Light touch is intact to the foot bilateral.   There is no clonus noted.  The hallux is downgoing bilateral.  Vibration intact     Derm: Nails 1-5 bilateral are thickened, elongated and crumbly with subungual debris. Skin is supple with normal texture and turgor noted.  Webspaces are clean, dry and intact bilateral.  There are no hyperkeratoses, ulcerations, verruca or other lesions noted.      Ortho: Muscle strength is 5/5 for all pedal groups tested.  Ankle joint, subtalar joint, 1st MPJ and lesser MPJ ROM is full and without pain or crepitus.  The foot type is rectus bilateral off weight bearing.  There are no structural deformities noted.    Assessment/Plan     DM  Painful nail mycosis        Toenails are debrided in length and thickness to avoid infection and for pain relief           Coby Reynolds-Aquiles, MARIO  [address] [place] [address]

## 2024-07-24 ENCOUNTER — LAB REQUISITION (OUTPATIENT)
Dept: LAB | Facility: HOSPITAL | Age: 69
End: 2024-07-24
Payer: MEDICARE

## 2024-07-24 ENCOUNTER — TELEPHONE (OUTPATIENT)
Dept: PRIMARY CARE | Facility: CLINIC | Age: 69
End: 2024-07-24

## 2024-07-24 DIAGNOSIS — N39.0 URINARY TRACT INFECTION, SITE NOT SPECIFIED: ICD-10-CM

## 2024-07-24 PROCEDURE — 87186 SC STD MICRODIL/AGAR DIL: CPT

## 2024-07-24 PROCEDURE — 87086 URINE CULTURE/COLONY COUNT: CPT

## 2024-07-24 NOTE — TELEPHONE ENCOUNTER
FYI:    Pt called wanting to get in to see Dr. Proctor for inner ear/ balance issue.   Offered Vane this afternoon, pt declined stated will go to [facility]

## 2024-07-25 ENCOUNTER — APPOINTMENT (OUTPATIENT)
Dept: PODIATRY | Facility: CLINIC | Age: 69
End: 2024-07-25
Payer: MEDICARE

## 2024-07-25 DIAGNOSIS — E11.9 DIABETES MELLITUS TYPE 2 WITHOUT RETINOPATHY (MULTI): ICD-10-CM

## 2024-07-25 DIAGNOSIS — B35.1 TINEA UNGUIUM: Primary | ICD-10-CM

## 2024-07-25 DIAGNOSIS — M79.674 PAIN IN TOES OF BOTH FEET: ICD-10-CM

## 2024-07-25 DIAGNOSIS — M79.675 PAIN IN TOES OF BOTH FEET: ICD-10-CM

## 2024-07-25 PROCEDURE — 11721 DEBRIDE NAIL 6 OR MORE: CPT | Performed by: PODIATRIST

## 2024-07-25 RX ORDER — NITROFURANTOIN 25; 75 MG/1; MG/1
100 CAPSULE ORAL DAILY
COMMUNITY
Start: 2024-07-24

## 2024-07-25 NOTE — PROGRESS NOTES
/History Of Present Illness  Lydia Lowe is a 68 y.o. female presenting for diabetic nail care. Patient complains with painful elongated nails.   PCP Dr Proctor,   last visit 4/2024     Past Medical History  She has a past medical history of Allergic (1980s?), Asthma ([facility]) (2000?), Cataract (2022), Diabetes mellitus (Multi) (2016), Eczema (2018), Encounter for general adult medical examination without abnormal findings (11/14/2012), Encounter for gynecological examination (general) (routine) without abnormal findings, ESR raised (10/19/2023), Gastro-esophageal reflux disease with esophagitis, without bleeding (07/18/2014), GERD (gastroesophageal reflux disease) (1999), Hypertension (2001?), Impaired fasting glucose, Other conditions influencing health status, Other conditions influencing health status (12/12/2012), Other specified noninflammatory disorders of vulva and perineum (08/30/2021), Personal history of diseases of the skin and subcutaneous tissue (04/12/2013), Personal history of other diseases of the circulatory system, Personal history of other diseases of the respiratory system, Personal history of other diseases of the respiratory system, Personal history of other diseases of the respiratory system, Urinary tract infection (Over 5 years ago?), Varicella (1958?), and Visual impairment (1963?).    Surgical History  She has a past surgical history that includes Colonoscopy (11/27/2012); Other surgical history (11/27/2012); Dilation and curettage of uterus (12/07/2012); Other surgical history (08/18/2017); Eye surgery (1993); and [place] tooth extraction (1994?).     Social History  She reports that she has never smoked. She has never used smokeless tobacco. She reports current alcohol use. She reports that she does not use drugs.    Family History  Family History   Problem Relation Name Age of Onset    Breast cancer Mother Mom? 81    Asthma Mother Mom?     Hypertension Mother Mom?     Brain  cancer Father Dad?     Diabetes Father Dad?     Colon cancer Father Dad?     COPD Father Dad?     Hypertension Father Dad?     Heart attack Sister      Stroke Sister      Coronary artery disease Brother          CABG    Stroke Brother      Hypertension Sibling      Stroke Sister Sheridan         Allergies  Carbapenems, Penicillins, Sulfa (sulfonamide antibiotics), and Nitrofurantoin monohyd/m-cryst    Medications  Current Outpatient Medications   Medication Sig Dispense Refill    albuterol 90 mcg/actuation inhaler 2 puffs every 4-6 hrs prn. 54 g 3    isma cit-mag-D3-Zn--man-bor (Citracal-D3 Plus Magnesium) 250- mg-mg-unit tablet Take 1 tablet by mouth once daily.      cholecalciferol (Vitamin D3) 25 MCG (1000 UT) tablet Take 1 tablet (25 mcg) by mouth once daily.      cinnamon 500 mg capsule Take 1,000 capsules (500,000 mg) by mouth once daily.      esomeprazole (NexIUM) 40 mg DR capsule Take 1 capsule (40 mg) by mouth once daily.      fluticasone (Cutivate) 0.05 % cream Apply topically.      loratadine (Claritin Liqui-Gel) 10 mg capsule Take 1 capsule by mouth if needed.      losartan (Cozaar) 50 mg tablet Take 1 tablet (50 mg) by mouth once daily. 90 tablet 3    mometasone (Nasonex) 50 mcg/actuation nasal spray Administer 2 sprays into each nostril once daily.      mometasone-formoterol (Dulera) 200-5 mcg/actuation inhaler INHALE 2 PUFFS AT 12 HOUR INTERVALS (MORNING AND EVENING) 39 g 3    montelukast (Singulair) 10 mg tablet Take 1 tablet (10 mg) by mouth once daily. 90 tablet 3    multivit-min/iron/folic/lutein (CENTRUM SILVER WOMEN ORAL) Take 1 tablet by mouth once daily.      nitrofurantoin, macrocrystal-monohydrate, (Macrobid) 100 mg capsule Take 1 capsule (100 mg) by mouth once daily.      olopatadine (Patanol) 0.1 % ophthalmic solution Administer 1 drop into both eyes 2 times a day. PRN      omega-3 fatty acids-fish oil 300-1,000 mg capsule Take 1 capsule (1,000 mg) by mouth once daily.       No current  facility-administered medications for this visit.       Review of Systems    REVIEW OF SYSTEMS  GENERAL:  Negative for malaise, significant weight loss, fever  CARDIOVASCULAR: leg swelling   MUSCULOSKELETAL:  Negative for joint pain or swelling, back pain, and muscle pain.  SKIN:  Negative for lesions, rash, and itching  PSYCH:  Negative for sleep disturbance, mood disorder and recent psychosocial stressors  NEURO: Negative, denies any burning, tingling or numbness     Objective: last A1c is 5.2  Vasc: DP and PT pulses are palpable bilateral.  CFT is less than 3 seconds bilateral.  Skin temperature is warm to cool proximal to distal bilateral.  No edema    Neuro:  Light touch is intact to the foot bilateral.   There is no clonus noted.  The hallux is downgoing bilateral.  Vibration intact with hypersensitivity      Derm: Nails 1-5 bilateral are thickened, elongated and crumbly with subungual debris. Skin is supple with normal texture and turgor noted.  Webspaces are clean, dry and intact bilateral.  There are no hyperkeratoses, ulcerations, verruca or other lesions noted.      Ortho: Muscle strength is 5/5 for all pedal groups tested.  Ankle joint, subtalar joint, 1st MPJ and lesser MPJ ROM is full and without pain or crepitus.  The foot type is rectus bilateral off weight bearing.  There are no structural deformities noted.    Assessment/Plan     DM  Painful nail mycosis        Toenails are debrided in length and thickness to avoid infection and for pain relief           Coby Maxwell, MARIO  [address] [place] [address]

## 2024-07-25 NOTE — TELEPHONE ENCOUNTER
7/25/24 called left message asking how she was doing.   Dr. Proctor wanted me to give her a call and if she still need to be seen to add her to 9:15 am Friday.

## 2024-07-27 LAB — BACTERIA UR CULT: ABNORMAL

## 2024-09-20 ENCOUNTER — TELEPHONE (OUTPATIENT)
Dept: OBSTETRICS AND GYNECOLOGY | Facility: CLINIC | Age: 69
End: 2024-09-20
Payer: MEDICARE

## 2024-09-20 DIAGNOSIS — N64.89 BREAST ASYMMETRY IN FEMALE: ICD-10-CM

## 2024-09-20 NOTE — TELEPHONE ENCOUNTER
Pt sent [employer] message for right dx mammo order for 6 month follow up due 10/2024. Order placed. Pt notified with message reply.

## 2024-09-23 ENCOUNTER — TELEPHONE (OUTPATIENT)
Dept: OBSTETRICS AND GYNECOLOGY | Facility: CLINIC | Age: 69
End: 2024-09-23
Payer: MEDICARE

## 2024-09-23 DIAGNOSIS — N64.89 BREAST ASYMMETRY IN FEMALE: ICD-10-CM

## 2024-09-23 NOTE — TELEPHONE ENCOUNTER
Attempted to call pt to follow up on her [employer] message she sent us.   Got her voice mail.    Left message to call office. Upon review of breast imaging she has had this past year, pt is indeed due for her bilateral mammogram next mos.  However, the order needs to be for a diagnostic bilateral mmg, based on right breast asymmetry still in right breast from 6 mos ago.   Order placed in system.

## 2024-09-23 NOTE — TELEPHONE ENCOUNTER
Pt returned call to office.   Discussed/explained need for bilateral dx mmg.   Understanding voiced.   Pt will call and schedule.

## 2024-09-26 ENCOUNTER — APPOINTMENT (OUTPATIENT)
Dept: PODIATRY | Facility: CLINIC | Age: 69
End: 2024-09-26
Payer: MEDICARE

## 2024-09-26 DIAGNOSIS — B35.1 TINEA UNGUIUM: Primary | ICD-10-CM

## 2024-09-26 DIAGNOSIS — E11.9 DIABETES MELLITUS TYPE 2 WITHOUT RETINOPATHY (MULTI): ICD-10-CM

## 2024-09-26 DIAGNOSIS — M79.675 PAIN IN TOES OF BOTH FEET: ICD-10-CM

## 2024-09-26 DIAGNOSIS — M79.674 PAIN IN TOES OF BOTH FEET: ICD-10-CM

## 2024-09-26 PROCEDURE — 11721 DEBRIDE NAIL 6 OR MORE: CPT | Performed by: PODIATRIST

## 2024-09-26 NOTE — PROGRESS NOTES
/History Of Present Illness  Lydia Lowe is a 69 y.o. female presenting for diabetic nail care. Patient complains with painful elongated nails.     PCP Marya Proctor MD  last visit 4/19/24     Past Medical History  She has a past medical history of Allergic (1980s?), Asthma ([facility]) (2000?), Cataract (2022), Diabetes mellitus (Multi) (2016), Eczema (2018), Encounter for general adult medical examination without abnormal findings (11/14/2012), Encounter for gynecological examination (general) (routine) without abnormal findings, ESR raised (10/19/2023), Gastro-esophageal reflux disease with esophagitis, without bleeding (07/18/2014), GERD (gastroesophageal reflux disease) (1999), Hypertension (2001?), Impaired fasting glucose, Other conditions influencing health status, Other conditions influencing health status (12/12/2012), Other specified noninflammatory disorders of vulva and perineum (08/30/2021), Personal history of diseases of the skin and subcutaneous tissue (04/12/2013), Personal history of other diseases of the circulatory system, Personal history of other diseases of the respiratory system, Personal history of other diseases of the respiratory system, Personal history of other diseases of the respiratory system, Urinary tract infection (Over 5 years ago?), Varicella (1958?), and Visual impairment (1963?).    Surgical History  She has a past surgical history that includes Colonoscopy (11/27/2012); Other surgical history (11/27/2012); Dilation and curettage of uterus (12/07/2012); Other surgical history (08/18/2017); Eye surgery (1993); and [place] tooth extraction (1994?).     Social History  She reports that she has never smoked. She has never used smokeless tobacco. She reports current alcohol use. She reports that she does not use drugs.    Family History  Family History   Problem Relation Name Age of Onset    Breast cancer Mother Mom? 81    Asthma Mother Mom?     Hypertension Mother Mom?      Brain cancer Father Dad?     Diabetes Father Dad?     Colon cancer Father Dad?     COPD Father Dad?     Hypertension Father Dad?     Heart attack Sister      Stroke Sister      Coronary artery disease Brother          CABG    Stroke Brother      Hypertension Sibling      Stroke Sister Sheridan         Allergies  Carbapenems, Penicillins, and Sulfa (sulfonamide antibiotics)    Medications  Current Outpatient Medications   Medication Sig Dispense Refill    albuterol 90 mcg/actuation inhaler 2 puffs every 4-6 hrs prn. 54 g 3    isma cit-mag-D3-Zn--man-bor (Citracal-D3 Plus Magnesium) 250- mg-mg-unit tablet Take 1 tablet by mouth once daily.      cholecalciferol (Vitamin D3) 25 MCG (1000 UT) tablet Take 1 tablet (25 mcg) by mouth once daily.      cinnamon 500 mg capsule Take 1,000 capsules (500,000 mg) by mouth once daily.      esomeprazole (NexIUM) 40 mg DR capsule Take 1 capsule (40 mg) by mouth once daily.      fluticasone (Cutivate) 0.05 % cream Apply topically.      loratadine (Claritin Liqui-Gel) 10 mg capsule Take 1 capsule by mouth if needed.      losartan (Cozaar) 50 mg tablet Take 1 tablet (50 mg) by mouth once daily. 90 tablet 3    mometasone (Nasonex) 50 mcg/actuation nasal spray Administer 2 sprays into each nostril once daily.      mometasone-formoterol (Dulera) 200-5 mcg/actuation inhaler INHALE 2 PUFFS AT 12 HOUR INTERVALS (MORNING AND EVENING) 39 g 3    montelukast (Singulair) 10 mg tablet Take 1 tablet (10 mg) by mouth once daily. 90 tablet 3    multivit-min/iron/folic/lutein (CENTRUM SILVER WOMEN ORAL) Take 1 tablet by mouth once daily.      olopatadine (Patanol) 0.1 % ophthalmic solution Administer 1 drop into both eyes 2 times a day. PRN      omega-3 fatty acids-fish oil 300-1,000 mg capsule Take 1 capsule (1,000 mg) by mouth once daily.      nitrofurantoin, macrocrystal-monohydrate, (Macrobid) 100 mg capsule Take 1 capsule (100 mg) by mouth once daily.       No current facility-administered  medications for this visit.       Review of Systems    REVIEW OF SYSTEMS  GENERAL:  Negative for malaise, significant weight loss, fever  CARDIOVASCULAR: leg swelling   MUSCULOSKELETAL:  Negative for joint pain or swelling, back pain, and muscle pain.  SKIN:  Negative for lesions, rash, and itching  PSYCH:  Negative for sleep disturbance, mood disorder and recent psychosocial stressors  NEURO: Negative, denies any burning, tingling or numbness     Objective: last A1c is 5.2  Vasc: DP and PT pulses are palpable bilateral.  CFT is less than 3 seconds bilateral.  Skin temperature is warm to cool proximal to distal bilateral.  No edema, no hair on toes     Neuro:  Light touch is intact to the foot bilateral.   There is no clonus noted.  The hallux is downgoing bilateral.  Vibration intact with hypersensitivity      Derm: Nails 1-5 bilateral are thickened, elongated and crumbly with subungual debris. Skin is supple with normal texture and turgor noted.  Webspaces are clean, dry and intact bilateral.  There are no hyperkeratoses, ulcerations, verruca or other lesions noted.      Ortho: Muscle strength is 5/5 for all pedal groups tested.  Ankle joint, subtalar joint, 1st MPJ and lesser MPJ ROM is full and without pain or crepitus.  The foot type is rectus bilateral off weight bearing.  There are no structural deformities noted.    Assessment/Plan     DM  Painful nail mycosis        Toenails are debrided in length and thickness to avoid infection and for pain relief           Coby Maxwell DPM  [id number] [place] [address]

## 2024-10-18 ENCOUNTER — APPOINTMENT (OUTPATIENT)
Dept: PRIMARY CARE | Facility: CLINIC | Age: 69
End: 2024-10-18
Payer: MEDICARE

## 2024-10-18 VITALS
DIASTOLIC BLOOD PRESSURE: 80 MMHG | SYSTOLIC BLOOD PRESSURE: 140 MMHG | OXYGEN SATURATION: 96 % | HEART RATE: 82 BPM | BODY MASS INDEX: 26.09 KG/M2 | WEIGHT: 133.6 LBS | TEMPERATURE: 99 F

## 2024-10-18 DIAGNOSIS — E78.49 OTHER HYPERLIPIDEMIA: ICD-10-CM

## 2024-10-18 DIAGNOSIS — R82.90 ABNORMAL URINE FINDING: ICD-10-CM

## 2024-10-18 DIAGNOSIS — E11.9 DIABETES MELLITUS TYPE 2 WITHOUT RETINOPATHY (MULTI): ICD-10-CM

## 2024-10-18 DIAGNOSIS — Z23 NEED FOR INFLUENZA VACCINATION: ICD-10-CM

## 2024-10-18 DIAGNOSIS — E55.9 VITAMIN D DEFICIENCY: ICD-10-CM

## 2024-10-18 DIAGNOSIS — I10 BENIGN ESSENTIAL HYPERTENSION: Primary | ICD-10-CM

## 2024-10-18 DIAGNOSIS — Z88.0 ALLERGY HISTORY, PENICILLIN: ICD-10-CM

## 2024-10-18 PROCEDURE — 90662 IIV NO PRSV INCREASED AG IM: CPT | Performed by: INTERNAL MEDICINE

## 2024-10-18 PROCEDURE — 1160F RVW MEDS BY RX/DR IN RCRD: CPT | Performed by: INTERNAL MEDICINE

## 2024-10-18 PROCEDURE — 99214 OFFICE O/P EST MOD 30 MIN: CPT | Performed by: INTERNAL MEDICINE

## 2024-10-18 PROCEDURE — 1123F ACP DISCUSS/DSCN MKR DOCD: CPT | Performed by: INTERNAL MEDICINE

## 2024-10-18 PROCEDURE — 4010F ACE/ARB THERAPY RXD/TAKEN: CPT | Performed by: INTERNAL MEDICINE

## 2024-10-18 PROCEDURE — 1159F MED LIST DOCD IN RCRD: CPT | Performed by: INTERNAL MEDICINE

## 2024-10-18 PROCEDURE — 3061F NEG MICROALBUMINURIA REV: CPT | Performed by: INTERNAL MEDICINE

## 2024-10-18 PROCEDURE — 3079F DIAST BP 80-89 MM HG: CPT | Performed by: INTERNAL MEDICINE

## 2024-10-18 PROCEDURE — 1157F ADVNC CARE PLAN IN RCRD: CPT | Performed by: INTERNAL MEDICINE

## 2024-10-18 PROCEDURE — G0008 ADMIN INFLUENZA VIRUS VAC: HCPCS | Performed by: INTERNAL MEDICINE

## 2024-10-18 PROCEDURE — 3077F SYST BP >= 140 MM HG: CPT | Performed by: INTERNAL MEDICINE

## 2024-10-18 PROCEDURE — 1036F TOBACCO NON-USER: CPT | Performed by: INTERNAL MEDICINE

## 2024-10-18 PROCEDURE — 3048F LDL-C <100 MG/DL: CPT | Performed by: INTERNAL MEDICINE

## 2024-10-18 PROCEDURE — 3044F HG A1C LEVEL LT 7.0%: CPT | Performed by: INTERNAL MEDICINE

## 2024-10-18 PROCEDURE — G2211 COMPLEX E/M VISIT ADD ON: HCPCS | Performed by: INTERNAL MEDICINE

## 2024-10-18 RX ORDER — HYDROGEN PEROXIDE 3 %
20 SOLUTION, NON-ORAL MISCELLANEOUS
COMMUNITY

## 2024-10-18 ASSESSMENT — ENCOUNTER SYMPTOMS
CONSTITUTIONAL NEGATIVE: 1
EYES NEGATIVE: 1
NEUROLOGICAL NEGATIVE: 1
CARDIOVASCULAR NEGATIVE: 1
RESPIRATORY NEGATIVE: 1
GASTROINTESTINAL NEGATIVE: 1
HEMATOLOGIC/LYMPHATIC NEGATIVE: 1
PSYCHIATRIC NEGATIVE: 1

## 2024-10-18 NOTE — ASSESSMENT & PLAN NOTE
A1c at goal.  Continue diet and exercise, doing well without medication and just with lifestyle modification.  Advised to avoid any NSAID intake due to abnormal ratio  albumin / creatinine  Check lab and urine.  Continue to be well-hydrated.

## 2024-10-18 NOTE — PROGRESS NOTES
"Subjective   Patient ID: Lydia Lowe is a 69 y.o. female who presents for Follow-up (Patient is here for a 6 month follow up. ).     Here to follow up and to go over her lab result,mammogram,colonoscopy  she has HTN,mild intermittent asthma,allergic rhinitis .her diabetes is well controlled with diet only.  Her urine albumin over creatinine ratio is slightly above normal.She takes NSAIDs twice a week for aches and pain.  I told her to avoid taking any NSAID and to take Tylenol which is safer for her,\" but she does not like to take Tylenol and  it does not help her pain\"  She sees Dr Palacio and Dr Keys for her eyes,has h/o retinal hemorrhage R eye,not related to diabetes.she has bilateral cataracts.  She needs a referral to see allergy to see if she truly had penicillin allergy.           Review of Systems   Constitutional: Negative.    HENT: Negative.     Eyes: Negative.    Respiratory: Negative.     Cardiovascular: Negative.    Gastrointestinal: Negative.    Genitourinary: Negative.    Musculoskeletal:         As HPI   Neurological: Negative.    Hematological: Negative.    Psychiatric/Behavioral: Negative.         Objective   /80 (BP Location: Left arm, Patient Position: Sitting)   Pulse 82   Temp 37.2 °C (99 °F) (Temporal)   Wt 60.6 kg (133 lb 9.6 oz)   SpO2 96%   BMI 26.09 kg/m²     Physical Exam  Constitutional:       Appearance: Normal appearance.   HENT:      Head: Normocephalic and atraumatic.   Eyes:      Extraocular Movements: Extraocular movements intact.      Pupils: Pupils are equal, round, and reactive to light.   Cardiovascular:      Rate and Rhythm: Normal rate and regular rhythm.      Heart sounds: Normal heart sounds.   Pulmonary:      Effort: Pulmonary effort is normal.      Breath sounds: Normal breath sounds. No wheezing or rhonchi.   Abdominal:      General: Abdomen is flat. Bowel sounds are normal. There is no distension.      Palpations: Abdomen is soft.   Musculoskeletal:    " "     General: Normal range of motion.      Cervical back: Normal range of motion and neck supple.      Right lower leg: No edema.      Left lower leg: No edema.   Skin:     General: Skin is warm.   Neurological:      General: No focal deficit present.      Mental Status: She is alert and oriented to person, place, and time.   Psychiatric:         Mood and Affect: Mood normal.         Behavior: Behavior normal.         Assessment/Plan   Problem List Items Addressed This Visit             ICD-10-CM    Benign essential hypertension - Primary I10     Today BP on the high side.  She put on 5 pound.  I discussed with her to monitor her blood pressure very closely twice daily, to bring her home blood pressure monitor to get validated and send me home BP log for next few days and decide if we need to increase her losartan up to 100 mg or keep it at the same dose of 50 mg daily.  Advised her to see him back in 2 weeks she declined because \"she has been seeing too many physician lately\"           Diabetes mellitus type 2 without retinopathy (Multi) E11.9     A1c at goal.  Continue diet and exercise, doing well without medication and just with lifestyle modification.  Advised to avoid any NSAID intake due to abnormal ratio  albumin / creatinine  Check lab and urine.  Continue to be well-hydrated.         Relevant Orders    Basic metabolic panel    Albumin-Creatinine Ratio, Urine Random    Hyperlipidemia E78.5     Follow low-fat diet.         Vitamin D deficiency E55.9    Need for influenza vaccination Z23    Relevant Orders    Flu vaccine, trivalent, preservative free, HIGH-DOSE, age 65y+ (Fluzone) (Completed)    Abnormal urine finding R82.90    Relevant Orders    Albumin-Creatinine Ratio, Urine Random    Allergy history, penicillin Z88.0    Relevant Orders    Referral to Allergy          "

## 2024-10-18 NOTE — ASSESSMENT & PLAN NOTE
"Today BP on the high side.  She put on 5 pound.  I discussed with her to monitor her blood pressure very closely twice daily, to bring her home blood pressure monitor to get validated and send me home BP log for next few days and decide if we need to increase her losartan up to 100 mg or keep it at the same dose of 50 mg daily.  Advised her to see him back in 2 weeks she declined because \"she has been seeing too many physician lately\"    "

## 2024-11-19 ENCOUNTER — HOSPITAL ENCOUNTER (OUTPATIENT)
Dept: RADIOLOGY | Facility: CLINIC | Age: 69
Discharge: HOME | End: 2024-11-19
Payer: MEDICARE

## 2024-11-19 VITALS — HEIGHT: 60 IN | BODY MASS INDEX: 26.23 KG/M2 | WEIGHT: 133.6 LBS

## 2024-11-19 DIAGNOSIS — N64.89 BREAST ASYMMETRY IN FEMALE: ICD-10-CM

## 2024-11-19 PROCEDURE — 77062 BREAST TOMOSYNTHESIS BI: CPT

## 2024-11-19 PROCEDURE — 77066 DX MAMMO INCL CAD BI: CPT | Performed by: STUDENT IN AN ORGANIZED HEALTH CARE EDUCATION/TRAINING PROGRAM

## 2024-11-19 PROCEDURE — G0279 TOMOSYNTHESIS, MAMMO: HCPCS | Performed by: STUDENT IN AN ORGANIZED HEALTH CARE EDUCATION/TRAINING PROGRAM

## 2024-11-22 ENCOUNTER — TELEPHONE (OUTPATIENT)
Dept: OBSTETRICS AND GYNECOLOGY | Facility: CLINIC | Age: 69
End: 2024-11-22
Payer: MEDICARE

## 2024-11-22 NOTE — TELEPHONE ENCOUNTER
Pt returned call to office.   Pt informed of stable right breast asymmetry on bilat mmg.   Follow up diagnostic bilateral mmg in one year is the recommendation from radiologist.  This will also be the final check on her right breast asymmetry at that time.   Understanding voiced.     Order will be placed in the system after the new year.

## 2024-11-22 NOTE — TELEPHONE ENCOUNTER
Attempted to reach pt by phone to provide her with result and plan of care per mauricio.  Got her voice mail.    Message left to call office.

## 2024-11-22 NOTE — TELEPHONE ENCOUNTER
----- Message from Shivani Marie sent at 11/21/2024  8:44 PM EST -----  Hi, I asked rads for clarification on this result. Do you mind relaying to the patient?    It is a stable asymmetry so they are returning her to q 1 year mammos, they will however still be diagnostic mammos.

## 2024-11-26 ASSESSMENT — ENCOUNTER SYMPTOMS
ORTHOPNEA: 0
HEADACHES: 0
BLURRED VISION: 0
PALPITATIONS: 0
NECK PAIN: 0
SHORTNESS OF BREATH: 0
SWEATS: 0
PND: 0
HYPERTENSION: 1

## 2024-11-27 ENCOUNTER — LAB (OUTPATIENT)
Dept: LAB | Facility: LAB | Age: 69
End: 2024-11-27
Payer: MEDICARE

## 2024-11-27 DIAGNOSIS — E11.9 DIABETES MELLITUS TYPE 2 WITHOUT RETINOPATHY (MULTI): ICD-10-CM

## 2024-11-27 DIAGNOSIS — R82.90 ABNORMAL URINE FINDING: ICD-10-CM

## 2024-11-27 LAB
ANION GAP SERPL CALC-SCNC: 9 MMOL/L (ref 10–20)
BUN SERPL-MCNC: 9 MG/DL (ref 6–23)
CALCIUM SERPL-MCNC: 9.1 MG/DL (ref 8.6–10.3)
CHLORIDE SERPL-SCNC: 104 MMOL/L (ref 98–107)
CO2 SERPL-SCNC: 30 MMOL/L (ref 21–32)
CREAT SERPL-MCNC: 0.56 MG/DL (ref 0.5–1.05)
CREAT UR-MCNC: 43.9 MG/DL (ref 20–320)
EGFRCR SERPLBLD CKD-EPI 2021: >90 ML/MIN/1.73M*2
GLUCOSE SERPL-MCNC: 88 MG/DL (ref 74–99)
MICROALBUMIN UR-MCNC: <7 MG/L
MICROALBUMIN/CREAT UR: NORMAL MG/G{CREAT}
POTASSIUM SERPL-SCNC: 3.9 MMOL/L (ref 3.5–5.3)
SODIUM SERPL-SCNC: 139 MMOL/L (ref 136–145)

## 2024-11-27 PROCEDURE — 80048 BASIC METABOLIC PNL TOTAL CA: CPT

## 2024-11-27 PROCEDURE — 82570 ASSAY OF URINE CREATININE: CPT

## 2024-11-27 PROCEDURE — 36415 COLL VENOUS BLD VENIPUNCTURE: CPT

## 2024-11-27 PROCEDURE — 82043 UR ALBUMIN QUANTITATIVE: CPT

## 2024-12-03 ENCOUNTER — APPOINTMENT (OUTPATIENT)
Dept: PRIMARY CARE | Facility: CLINIC | Age: 69
End: 2024-12-03
Payer: MEDICARE

## 2024-12-03 VITALS
SYSTOLIC BLOOD PRESSURE: 128 MMHG | DIASTOLIC BLOOD PRESSURE: 70 MMHG | OXYGEN SATURATION: 96 % | TEMPERATURE: 96.7 F | HEART RATE: 84 BPM | BODY MASS INDEX: 26.29 KG/M2 | WEIGHT: 134.6 LBS

## 2024-12-03 DIAGNOSIS — B37.81 CANDIDA ESOPHAGITIS (MULTI): ICD-10-CM

## 2024-12-03 DIAGNOSIS — E11.9 DIABETES MELLITUS TYPE 2 WITHOUT RETINOPATHY (MULTI): Primary | ICD-10-CM

## 2024-12-03 DIAGNOSIS — E11.9 DIABETES MELLITUS TYPE 2 WITHOUT RETINOPATHY (MULTI): ICD-10-CM

## 2024-12-03 DIAGNOSIS — E78.49 OTHER HYPERLIPIDEMIA: ICD-10-CM

## 2024-12-03 DIAGNOSIS — I10 BENIGN ESSENTIAL HYPERTENSION: Primary | ICD-10-CM

## 2024-12-03 PROCEDURE — 1160F RVW MEDS BY RX/DR IN RCRD: CPT | Performed by: INTERNAL MEDICINE

## 2024-12-03 PROCEDURE — 3074F SYST BP LT 130 MM HG: CPT | Performed by: INTERNAL MEDICINE

## 2024-12-03 PROCEDURE — 1159F MED LIST DOCD IN RCRD: CPT | Performed by: INTERNAL MEDICINE

## 2024-12-03 PROCEDURE — 3062F POS MACROALBUMINURIA REV: CPT | Performed by: INTERNAL MEDICINE

## 2024-12-03 PROCEDURE — 99214 OFFICE O/P EST MOD 30 MIN: CPT | Performed by: INTERNAL MEDICINE

## 2024-12-03 PROCEDURE — 90677 PCV20 VACCINE IM: CPT | Performed by: INTERNAL MEDICINE

## 2024-12-03 PROCEDURE — 1123F ACP DISCUSS/DSCN MKR DOCD: CPT | Performed by: INTERNAL MEDICINE

## 2024-12-03 PROCEDURE — 1157F ADVNC CARE PLAN IN RCRD: CPT | Performed by: INTERNAL MEDICINE

## 2024-12-03 PROCEDURE — G2211 COMPLEX E/M VISIT ADD ON: HCPCS | Performed by: INTERNAL MEDICINE

## 2024-12-03 PROCEDURE — 3078F DIAST BP <80 MM HG: CPT | Performed by: INTERNAL MEDICINE

## 2024-12-03 PROCEDURE — 3044F HG A1C LEVEL LT 7.0%: CPT | Performed by: INTERNAL MEDICINE

## 2024-12-03 PROCEDURE — 3048F LDL-C <100 MG/DL: CPT | Performed by: INTERNAL MEDICINE

## 2024-12-03 PROCEDURE — G0009 ADMIN PNEUMOCOCCAL VACCINE: HCPCS | Performed by: INTERNAL MEDICINE

## 2024-12-03 PROCEDURE — 4010F ACE/ARB THERAPY RXD/TAKEN: CPT | Performed by: INTERNAL MEDICINE

## 2024-12-03 RX ORDER — FLUCONAZOLE 100 MG/1
100 TABLET ORAL
COMMUNITY
Start: 2024-11-12 | End: 2025-02-10

## 2024-12-03 NOTE — PROGRESS NOTES
Subjective   Patient ID: Lydia Lowe is a 69 y.o. female who presents for Follow-up (Patient is here for a follow up on blood pressure. ).    Patient seen for follow-up on her hypertension, diabetes mellitus type 2 diet-controlled, mild intermittent asthma and allergic rhinitis.  Since last visit she had EGD and colonoscopy by , report and biopsy were reviewed with patient, she is currently on Diflucan.  She has upcoming appointment with Dr. Willis, allergist in February, she will discuss her steroid inhaler with her which could have contributed to her candidal esophagitis ,as per biopsy done from November 12, 2024  She brought copy of her home BP log, sometimes her BP has been elevated, related to possible underlying stressful circumstances.  ,    Hypertension  This is a new problem. The current episode started more than 1 year ago. The problem has been gradually improving since onset. The problem is controlled. Pertinent negatives include no anxiety, blurred vision, chest pain, headaches, malaise/fatigue, neck pain, orthopnea, palpitations, peripheral edema, PND, shortness of breath or sweats. Agents associated with hypertension include decongestants and NSAIDs. Risk factors for coronary artery disease include diabetes mellitus and family history. There are no compliance problems.         Review of Systems   Constitutional:  Negative for malaise/fatigue.   Eyes:  Negative for blurred vision.   Respiratory:  Negative for shortness of breath.    Cardiovascular:  Negative for chest pain, palpitations, orthopnea and PND.   Musculoskeletal:  Negative for neck pain.   Neurological:  Negative for headaches.       Objective   /70 (BP Location: Left arm, Patient Position: Sitting)   Pulse 84   Temp 35.9 °C (96.7 °F) (Temporal)   Wt 61.1 kg (134 lb 9.6 oz)   SpO2 96%   BMI 26.29 kg/m²     Physical Exam  Constitutional:       Appearance: Normal appearance.   HENT:      Head: Normocephalic and  atraumatic.      Mouth/Throat:      Mouth: Mucous membranes are moist.      Pharynx: No oropharyngeal exudate or posterior oropharyngeal erythema.   Eyes:      Extraocular Movements: Extraocular movements intact.      Pupils: Pupils are equal, round, and reactive to light.   Neck:      Vascular: No carotid bruit.   Cardiovascular:      Rate and Rhythm: Normal rate and regular rhythm.      Heart sounds: Normal heart sounds.   Pulmonary:      Effort: Pulmonary effort is normal. No respiratory distress.      Breath sounds: Normal breath sounds. No wheezing or rhonchi.   Abdominal:      General: Abdomen is flat. There is no distension.      Palpations: Abdomen is soft.   Musculoskeletal:         General: Normal range of motion.      Cervical back: Normal range of motion and neck supple.      Right lower leg: No edema.      Left lower leg: No edema.   Lymphadenopathy:      Cervical: No cervical adenopathy.   Skin:     General: Skin is warm.   Neurological:      General: No focal deficit present.      Mental Status: She is alert and oriented to person, place, and time.   Psychiatric:         Mood and Affect: Mood normal.         Behavior: Behavior normal.         Assessment/Plan   Problem List Items Addressed This Visit             ICD-10-CM    Benign essential hypertension - Primary I10     Stable on current medication.  Counseled regarding relaxation techniques and regular walking.  Return for physical in 4 months.         Diabetes mellitus type 2 without retinopathy (Multi) E11.9     A1c at goal.  Continue diet and exercise, doing well without medication and just with lifestyle modification.           Hyperlipidemia E78.5     Follow low-fat diet.         Candida esophagitis (Multi) B37.81     Currently on Diflucan management as per GI.

## 2024-12-05 ENCOUNTER — APPOINTMENT (OUTPATIENT)
Dept: PODIATRY | Facility: CLINIC | Age: 69
End: 2024-12-05
Payer: MEDICARE

## 2024-12-05 DIAGNOSIS — M79.675 PAIN IN TOES OF BOTH FEET: Primary | ICD-10-CM

## 2024-12-05 DIAGNOSIS — M79.674 PAIN IN TOES OF BOTH FEET: Primary | ICD-10-CM

## 2024-12-05 DIAGNOSIS — E11.9 COMPREHENSIVE DIABETIC FOOT EXAMINATION, TYPE 2 DM, ENCOUNTER FOR (MULTI): ICD-10-CM

## 2024-12-05 DIAGNOSIS — B35.1 TINEA UNGUIUM: ICD-10-CM

## 2024-12-05 DIAGNOSIS — E11.9 DIABETES MELLITUS TYPE 2 WITHOUT RETINOPATHY (MULTI): ICD-10-CM

## 2024-12-05 PROCEDURE — 3044F HG A1C LEVEL LT 7.0%: CPT | Performed by: PODIATRIST

## 2024-12-05 PROCEDURE — 4010F ACE/ARB THERAPY RXD/TAKEN: CPT | Performed by: PODIATRIST

## 2024-12-05 PROCEDURE — 3048F LDL-C <100 MG/DL: CPT | Performed by: PODIATRIST

## 2024-12-05 PROCEDURE — 1160F RVW MEDS BY RX/DR IN RCRD: CPT | Performed by: PODIATRIST

## 2024-12-05 PROCEDURE — 99213 OFFICE O/P EST LOW 20 MIN: CPT | Performed by: PODIATRIST

## 2024-12-05 PROCEDURE — 1157F ADVNC CARE PLAN IN RCRD: CPT | Performed by: PODIATRIST

## 2024-12-05 PROCEDURE — 1159F MED LIST DOCD IN RCRD: CPT | Performed by: PODIATRIST

## 2024-12-05 PROCEDURE — 3062F POS MACROALBUMINURIA REV: CPT | Performed by: PODIATRIST

## 2024-12-05 PROCEDURE — 1123F ACP DISCUSS/DSCN MKR DOCD: CPT | Performed by: PODIATRIST

## 2024-12-05 NOTE — PROGRESS NOTES
/History Of Present Illness  Lydia Lowe is a 69 y.o. female presenting for diabetic nail care. Patient complains with painful elongated nails.     PCP Marya Proctor MD  last visit 12/3/24     Past Medical History  She has a past medical history of Allergic (1980s?), Asthma (2000?), Bunion, Callus, Cataract (2022), Diabetes mellitus (Multi) (2016), Eczema (2018), Encounter for general adult medical examination without abnormal findings (11/14/2012), Encounter for gynecological examination (general) (routine) without abnormal findings, ESR raised (10/19/2023), Gastro-esophageal reflux disease with esophagitis, without bleeding (07/18/2014), GERD (gastroesophageal reflux disease) (1999), Hammer toe, High arches, Hypertension (2001?), Impaired fasting glucose, Ingrown toenail, Other conditions influencing health status, Other conditions influencing health status (12/12/2012), Other specified noninflammatory disorders of vulva and perineum (08/30/2021), Personal history of diseases of the skin and subcutaneous tissue (04/12/2013), Personal history of other diseases of the circulatory system, Personal history of other diseases of the respiratory system, Personal history of other diseases of the respiratory system, Personal history of other diseases of the respiratory system, Urinary tract infection (Over 5 years ago?), Varicella (1958?), and Visual impairment (1963?).    Surgical History  She has a past surgical history that includes Colonoscopy (11/27/2012); Other surgical history (11/27/2012); Dilation and curettage of uterus (12/07/2012); Other surgical history (08/18/2017); Eye surgery (1993); [place] tooth extraction (1994?); and Toenail excision.     Social History  She reports that she has never smoked. She has never used smokeless tobacco. She reports current alcohol use. She reports that she does not use drugs.    Family History  Family History   Problem Relation Name Age of Onset    Breast cancer  Mother Mom? 81    Asthma Mother Mom?     Hypertension Mother Mom?     Osteoporosis Mother Mom?     Brain cancer Father Dad?     Diabetes Father Dad?     Colon cancer Father Dad?     COPD Father Dad?     Hypertension Father Dad?     Cancer Father Dad?     Heart attack Sister Dayna     Stroke Sister Dayna     Osteoporosis Sister Dayna     Coronary artery disease Brother          CABG    Stroke Brother      Hypertension Sibling      Stroke Sister Sheridan     Osteoporosis Sister Sheridan         Allergies  Carbapenems and Penicillins    Medications  Current Outpatient Medications   Medication Sig Dispense Refill    albuterol 90 mcg/actuation inhaler 2 puffs every 4-6 hrs prn. 54 g 3    isma cit-mag-D3-Zn--man-bor (Citracal-D3 Plus Magnesium) 250- mg-mg-unit tablet Take 1 tablet by mouth once daily.      cholecalciferol (Vitamin D3) 25 MCG (1000 UT) tablet Take 1 tablet (25 mcg) by mouth once daily.      cinnamon bark (CINNAMON ORAL) Take 2,000 mg by mouth once daily. CAPSULES      esomeprazole (NexIUM) 20 mg DR capsule Take 1 capsule (20 mg) by mouth once daily in the morning. Take before meals. Do not open capsule.      fluconazole (Diflucan) 100 mg tablet Take 1 tablet (100 mg) by mouth once daily.      fluticasone (Cutivate) 0.05 % cream Apply topically.      loratadine (CLARITIN ORAL) Take 5 mg by mouth if needed. DISSOLVABLE      losartan (Cozaar) 50 mg tablet Take 1 tablet (50 mg) by mouth once daily. 90 tablet 3    mometasone (Nasonex) 50 mcg/actuation nasal spray Administer 2 sprays into each nostril once daily.      mometasone-formoterol (Dulera) 200-5 mcg/actuation inhaler INHALE 2 PUFFS AT 12 HOUR INTERVALS (MORNING AND EVENING) 39 g 3    montelukast (Singulair) 10 mg tablet Take 1 tablet (10 mg) by mouth once daily. 90 tablet 3    multivit-min/iron/folic/lutein (CENTRUM SILVER WOMEN ORAL) Take 1 tablet by mouth once daily.      olopatadine (Patanol) 0.1 % ophthalmic solution Administer 1 drop into both eyes 2  times a day. PRN      omega-3 fatty acids-fish oil 300-1,000 mg capsule Take 1 capsule (1,000 mg) by mouth once daily.       No current facility-administered medications for this visit.       Review of Systems    REVIEW OF SYSTEMS  GENERAL:  Negative for malaise, significant weight loss, fever  CARDIOVASCULAR: leg swelling   MUSCULOSKELETAL:  Negative for joint pain or swelling, back pain, and muscle pain.  SKIN:  Negative for lesions, rash, and itching  PSYCH:  Negative for sleep disturbance, mood disorder and recent psychosocial stressors  NEURO: Negative, denies any burning, tingling or numbness     Objective: last A1c is 5.2  Vasc: DP and PT pulses are palpable bilateral.  CFT is less than 3 seconds bilateral.  Skin temperature is warm to cool proximal to distal bilateral.  No edema, no hair on toes     Neuro:  Light touch is intact to the foot bilateral.   There is no clonus noted.  The hallux is downgoing bilateral.  Vibration intact with hypersensitivity.  Light touch intact with some Candice filament      Derm: Nails 1-5 bilateral are thickened, elongated and crumbly with subungual debris. Skin is supple with normal texture and turgor noted.  Webspaces are clean, dry and intact bilateral.  There are  hyperkeratoses, ulcerations, verruca or other lesions noted.  Calluses subsecond metatarsal head    Ortho: Muscle strength is 5/5 for all pedal groups tested.  Ankle joint, subtalar joint, 1st MPJ and lesser MPJ ROM is full and without pain or crepitus.  The foot type is rectus bilateral off weight bearing.  Large bunion deformity that causes some discomfort    Assessment/Plan   Comprehensive diabetic foot exam  DM  Painful nail mycosis    Patient's calluses do not seem  preulcerative.  I do not feel patient has it at significant risk for foot ulceration.    Toenails are debrided in length and thickness to avoid infection and for pain relief           Coby Reynolds-Aquiles, MARIO  [address]  [place] [id number]

## 2024-12-06 PROBLEM — B37.81 CANDIDA ESOPHAGITIS (MULTI): Status: ACTIVE | Noted: 2024-12-06

## 2024-12-06 ASSESSMENT — ENCOUNTER SYMPTOMS
PALPITATIONS: 0
SHORTNESS OF BREATH: 0
PND: 0
NECK PAIN: 0
HEADACHES: 0
BLURRED VISION: 0
SWEATS: 0
HYPERTENSION: 1
ORTHOPNEA: 0

## 2024-12-06 NOTE — ASSESSMENT & PLAN NOTE
Currently on Diflucan management as per GI.   Medication and Quantity requested: lisdexamfetamine (VYVANSE) 30 MG capsule  Qty 30     Last Visit  4/26/17    Pharmacy and phone number updated in [facility]:  yes

## 2024-12-06 NOTE — ASSESSMENT & PLAN NOTE
Stable on current medication.  Counseled regarding relaxation techniques and regular walking.  Return for physical in 4 months.

## 2024-12-10 ENCOUNTER — LAB (OUTPATIENT)
Dept: LAB | Facility: LAB | Age: 69
End: 2024-12-10
Payer: MEDICARE

## 2024-12-10 DIAGNOSIS — E11.9 DIABETES MELLITUS TYPE 2 WITHOUT RETINOPATHY (MULTI): ICD-10-CM

## 2024-12-10 LAB
EST. AVERAGE GLUCOSE BLD GHB EST-MCNC: 108 MG/DL
HBA1C MFR BLD: 5.4 %

## 2024-12-10 PROCEDURE — 36415 COLL VENOUS BLD VENIPUNCTURE: CPT

## 2024-12-10 PROCEDURE — 83036 HEMOGLOBIN GLYCOSYLATED A1C: CPT

## 2025-01-03 DIAGNOSIS — J45.20 MILD INTERMITTENT ASTHMA WITHOUT COMPLICATION (HHS-HCC): ICD-10-CM

## 2025-01-03 RX ORDER — ALBUTEROL SULFATE 90 UG/1
INHALANT RESPIRATORY (INHALATION)
Qty: 25.5 G | Refills: 3 | Status: SHIPPED | OUTPATIENT
Start: 2025-01-03

## 2025-01-10 ENCOUNTER — TELEPHONE (OUTPATIENT)
Dept: OBSTETRICS AND GYNECOLOGY | Facility: CLINIC | Age: 70
End: 2025-01-10
Payer: MEDICARE

## 2025-01-10 DIAGNOSIS — N64.89 BREAST ASYMMETRY IN FEMALE: ICD-10-CM

## 2025-01-10 NOTE — TELEPHONE ENCOUNTER
Order for bilateral diagnostic mmg placed in system per mauricio.  This is due to be performed in 11/2025.

## 2025-01-10 NOTE — TELEPHONE ENCOUNTER
----- Message from Nurse Peyton LYMAN sent at 11/22/2024  2:15 PM EST -----  Regarding: bilateral diagnostic mmg needed for 11/2025  Place order in system today per mauricio

## 2025-02-11 ENCOUNTER — APPOINTMENT (OUTPATIENT)
Dept: PODIATRY | Facility: CLINIC | Age: 70
End: 2025-02-11
Payer: MEDICARE

## 2025-02-11 DIAGNOSIS — E11.9 DIABETES MELLITUS TYPE 2 WITHOUT RETINOPATHY (MULTI): ICD-10-CM

## 2025-02-11 DIAGNOSIS — B35.1 NAIL FUNGUS: Primary | ICD-10-CM

## 2025-02-11 DIAGNOSIS — M79.674 PAIN IN TOES OF BOTH FEET: ICD-10-CM

## 2025-02-11 DIAGNOSIS — B35.1 TINEA UNGUIUM: ICD-10-CM

## 2025-02-11 DIAGNOSIS — M79.675 PAIN IN TOES OF BOTH FEET: ICD-10-CM

## 2025-02-11 PROCEDURE — 99212 OFFICE O/P EST SF 10 MIN: CPT | Performed by: PODIATRIST

## 2025-02-11 NOTE — PROGRESS NOTES
/History Of Present Illness  Lydia Lowe is a 69 y.o. female presenting for diabetic nail care. Patient complains with painful elongated nails. Last A1c is 5.4     PCP Marya Proctor MD  last visit 12/3/24     Past Medical History  She has a past medical history of Allergic (1980s?), Asthma (2000?), Bunion, Callus, Cataract (2022), Diabetes mellitus (Multi) (2016), Eczema (2018), Encounter for general adult medical examination without abnormal findings (11/14/2012), Encounter for gynecological examination (general) (routine) without abnormal findings, ESR raised (10/19/2023), Gastro-esophageal reflux disease with esophagitis, without bleeding (07/18/2014), GERD (gastroesophageal reflux disease) (1999), Hammer toe, High arches, Hypertension (2001?), Impaired fasting glucose, Ingrown toenail, Other conditions influencing health status, Other conditions influencing health status (12/12/2012), Other specified noninflammatory disorders of vulva and perineum (08/30/2021), Personal history of diseases of the skin and subcutaneous tissue (04/12/2013), Personal history of other diseases of the circulatory system, Personal history of other diseases of the respiratory system, Personal history of other diseases of the respiratory system, Personal history of other diseases of the respiratory system, Urinary tract infection (Over 5 years ago?), Varicella (1958?), and Visual impairment (1963?).    Surgical History  She has a past surgical history that includes Colonoscopy (11/27/2012); Other surgical history (11/27/2012); Dilation and curettage of uterus (12/07/2012); Other surgical history (08/18/2017); Eye surgery (1993); [place] tooth extraction (1994?); and Toenail excision.     Social History  She reports that she has never smoked. She has never used smokeless tobacco. She reports current alcohol use. She reports that she does not use drugs.    Family History  Family History   Problem Relation Name Age of Onset     Breast cancer Mother Mom? 81    Asthma Mother Mom?     Hypertension Mother Mom?     Osteoporosis Mother Mom?     Brain cancer Father Dad?     Diabetes Father Dad?     Colon cancer Father Dad?     COPD Father Dad?     Hypertension Father Dad?     Cancer Father Dad?     Heart attack Sister Dayna     Stroke Sister Dayna     Osteoporosis Sister Dayna     Coronary artery disease Brother          CABG    Stroke Brother      Hypertension Sibling      Stroke Sister Sheridan     Osteoporosis Sister Sheridan         Allergies  Carbapenems and Penicillins    Medications  Current Outpatient Medications   Medication Sig Dispense Refill    albuterol 90 mcg/actuation inhaler 2 puffs every 4-6 hrs prn. 25.5 g 3    isma cit-mag-D3-Zn--man-bor (Citracal-D3 Plus Magnesium) 250- mg-mg-unit tablet Take 1 tablet by mouth once daily.      cholecalciferol (Vitamin D3) 25 MCG (1000 UT) tablet Take 1 tablet (25 mcg) by mouth once daily.      cinnamon bark (CINNAMON ORAL) Take 2,000 mg by mouth once daily. CAPSULES      esomeprazole (NexIUM) 20 mg DR capsule Take 1 capsule (20 mg) by mouth once daily in the morning. Take before meals. Do not open capsule.      fluticasone (Cutivate) 0.05 % cream Apply topically.      loratadine (CLARITIN ORAL) Take 5 mg by mouth if needed. DISSOLVABLE      losartan (Cozaar) 50 mg tablet Take 1 tablet (50 mg) by mouth once daily. 90 tablet 3    mometasone (Nasonex) 50 mcg/actuation nasal spray Administer 2 sprays into each nostril once daily.      mometasone-formoterol (Dulera) 200-5 mcg/actuation inhaler INHALE 2 PUFFS AT 12 HOUR INTERVALS (MORNING AND EVENING) 39 g 3    montelukast (Singulair) 10 mg tablet Take 1 tablet (10 mg) by mouth once daily. 90 tablet 3    multivit-min/iron/folic/lutein (CENTRUM SILVER WOMEN ORAL) Take 1 tablet by mouth once daily.      olopatadine (Patanol) 0.1 % ophthalmic solution Administer 1 drop into both eyes 2 times a day. PRN      omega-3 fatty acids-fish oil 300-1,000 mg  capsule Take 1 capsule (1,000 mg) by mouth once daily.       No current facility-administered medications for this visit.       Review of Systems    REVIEW OF SYSTEMS  GENERAL:  Negative for malaise, significant weight loss, fever  CARDIOVASCULAR: leg swelling   MUSCULOSKELETAL:  Negative for joint pain or swelling, back pain, and muscle pain.  SKIN:  Negative for lesions, rash, and itching  PSYCH:  Negative for sleep disturbance, mood disorder and recent psychosocial stressors  NEURO: Negative, denies any burning, tingling or numbness     Objective: last A1c is 5.2  Vasc: DP and PT pulses are palpable bilateral.  CFT is less than 3 seconds bilateral.  Skin temperature is warm to cool proximal to distal bilateral.  No edema, no hair on toes     Neuro:  Light touch is intact to the foot bilateral.   There is no clonus noted.  The hallux is downgoing bilateral.  Vibration intact with hypersensitivity.  Light touch intact with some Candice filament      Derm: Nails 1-5 bilateral are thickened, elongated and crumbly with subungual debris. Skin is supple with normal texture and turgor noted.  Webspaces are clean, dry and intact bilateral.  There are  hyperkeratoses, ulcerations, verruca or other lesions noted.  Calluses subsecond metatarsal head -not preulcerative b/l     Ortho: Muscle strength is 5/5 for all pedal groups tested.  Ankle joint, subtalar joint, 1st MPJ and lesser MPJ ROM is full and without pain or crepitus.  The foot type is rectus bilateral off weight bearing.  Large bunion deformity that causes some discomfort    Assessment/Plan   Comprehensive diabetic foot exam  DM  Painful nail mycosis    Patient's calluses do not seem  preulcerative.  I do not feel patient has it at significant risk for foot ulceration.    Toenails are debrided in length and thickness to avoid infection and for pain relief     A1c is in normal range       Coby Maxwell, MARIO  [address] [place] [address]

## 2025-02-17 ENCOUNTER — APPOINTMENT (OUTPATIENT)
Dept: ALLERGY | Facility: CLINIC | Age: 70
End: 2025-02-17
Payer: MEDICARE

## 2025-03-11 DIAGNOSIS — J45.41 MODERATE PERSISTENT ALLERGIC ASTHMA WITH ACUTE EXACERBATION (HHS-HCC): ICD-10-CM

## 2025-03-11 RX ORDER — MONTELUKAST SODIUM 10 MG/1
10 TABLET ORAL DAILY
Qty: 90 TABLET | Refills: 3 | Status: SHIPPED | OUTPATIENT
Start: 2025-03-11

## 2025-03-25 DIAGNOSIS — I10 BENIGN ESSENTIAL HYPERTENSION: ICD-10-CM

## 2025-03-25 RX ORDER — LOSARTAN POTASSIUM 50 MG/1
50 TABLET ORAL DAILY
Qty: 90 TABLET | Refills: 3 | Status: SHIPPED | OUTPATIENT
Start: 2025-03-25

## 2025-04-02 DIAGNOSIS — J45.41 MODERATE PERSISTENT ALLERGIC ASTHMA WITH ACUTE EXACERBATION (HHS-HCC): ICD-10-CM

## 2025-04-02 RX ORDER — MOMETASONE FUROATE AND FORMOTEROL FUMARATE DIHYDRATE 200; 5 UG/1; UG/1
AEROSOL RESPIRATORY (INHALATION)
Qty: 39 G | Refills: 0 | Status: SHIPPED | OUTPATIENT
Start: 2025-04-02

## 2025-04-15 ENCOUNTER — APPOINTMENT (OUTPATIENT)
Dept: PODIATRY | Facility: CLINIC | Age: 70
End: 2025-04-15
Payer: MEDICARE

## 2025-04-25 ENCOUNTER — APPOINTMENT (OUTPATIENT)
Dept: PRIMARY CARE | Facility: CLINIC | Age: 70
End: 2025-04-25
Payer: MEDICARE

## 2025-04-25 VITALS
WEIGHT: 133.4 LBS | HEART RATE: 90 BPM | SYSTOLIC BLOOD PRESSURE: 128 MMHG | TEMPERATURE: 98.2 F | HEIGHT: 58 IN | BODY MASS INDEX: 28 KG/M2 | OXYGEN SATURATION: 97 % | DIASTOLIC BLOOD PRESSURE: 77 MMHG

## 2025-04-25 DIAGNOSIS — H35.3222 EXUDATIVE AGE-RELATED MACULAR DEGENERATION, LEFT EYE, WITH INACTIVE CHOROIDAL NEOVASCULARIZATION: ICD-10-CM

## 2025-04-25 DIAGNOSIS — Z78.0 ASYMPTOMATIC MENOPAUSE: ICD-10-CM

## 2025-04-25 DIAGNOSIS — E11.9 DIABETES MELLITUS TYPE 2 WITHOUT RETINOPATHY (MULTI): ICD-10-CM

## 2025-04-25 DIAGNOSIS — Z00.00 ROUTINE GENERAL MEDICAL EXAMINATION AT HEALTH CARE FACILITY: ICD-10-CM

## 2025-04-25 DIAGNOSIS — Z00.00 ANNUAL PHYSICAL EXAM: ICD-10-CM

## 2025-04-25 DIAGNOSIS — E78.49 OTHER HYPERLIPIDEMIA: ICD-10-CM

## 2025-04-25 DIAGNOSIS — I10 BENIGN ESSENTIAL HYPERTENSION: ICD-10-CM

## 2025-04-25 DIAGNOSIS — Z00.00 MEDICARE ANNUAL WELLNESS VISIT, SUBSEQUENT: Primary | ICD-10-CM

## 2025-04-25 DIAGNOSIS — M85.80 OSTEOPENIA, UNSPECIFIED LOCATION: ICD-10-CM

## 2025-04-25 PROBLEM — B37.81 CANDIDA ESOPHAGITIS (MULTI): Status: RESOLVED | Noted: 2024-12-06 | Resolved: 2025-04-25

## 2025-04-25 ASSESSMENT — ACTIVITIES OF DAILY LIVING (ADL)
TAKING_MEDICATION: INDEPENDENT
BATHING: INDEPENDENT
GROCERY_SHOPPING: INDEPENDENT
DRESSING: INDEPENDENT
DOING_HOUSEWORK: INDEPENDENT
MANAGING_FINANCES: INDEPENDENT

## 2025-04-25 ASSESSMENT — ENCOUNTER SYMPTOMS
RESPIRATORY NEGATIVE: 1
GASTROINTESTINAL NEGATIVE: 1
HEMATOLOGIC/LYMPHATIC NEGATIVE: 1
NEUROLOGICAL NEGATIVE: 1
CONSTITUTIONAL NEGATIVE: 1
CARDIOVASCULAR NEGATIVE: 1
EYES NEGATIVE: 1
PSYCHIATRIC NEGATIVE: 1

## 2025-04-25 NOTE — ASSESSMENT & PLAN NOTE
Continue 1800-calorie ADA diet and regular exercise ,A1c at goal.  Continue diet and exercise, doing well without medication and just with lifestyle modification.      Orders:    CBC; Future    Comprehensive Metabolic Panel; Future    Hemoglobin A1C; Future

## 2025-04-25 NOTE — ASSESSMENT & PLAN NOTE
Follow low-fat diet.    Orders:    Lipid Panel; Future    TSH with reflex to Free T4 if abnormal; Future

## 2025-04-25 NOTE — PROGRESS NOTES
"Subjective   Reason for Visit: Lydia Lowe is an 69 y.o. female here for a Medicare Wellness visit.     Past Medical, Surgical, and Family History reviewed and updated in chart.    Reviewed all medications by prescribing practitioner or clinical pharmacist (such as prescriptions, OTCs, herbal therapies and supplements) and documented in the medical record.    This is a 69-year-old patient seen today for MCR ,CPE and for follow-up on her hypertension, diabetes mellitus type 2 diet-controlled, mild intermittent asthma and allergic rhinitis.  She has been less in her mouth after each use of her steroid inhaler and denies any sore throat or mouth rash.  Her asthma has been stable,will see [facility]ist soon  She sees the retina specialist,getting injection, had recent lab with him for sed rate, he told her to send me a letter to review he think she might have A-fib but patient denies any palpitation or chest pain.   she had EGD and colonoscopy by  9/10/2024, report and biopsy were reviewed.  She c/o intermittent R hip pain for 2 months,respond to Advil as needed,she also takes it for her occasional left shoulder pain.  BI MAMMOGRAM: 11/19/2024  COLONOSCOPY: 9/10/2024, next in 5 years for surveillance  BONE DENSITY: 10/3/2022   9/30/21 cardiac calcium score 0        Patient Care Team:  Marya Proctor MD as PCP - General  Marya Proctor MD as PCP - O Medicare Advantage PCP     Review of Systems   Constitutional: Negative.    HENT: Negative.     Eyes: Negative.    Respiratory: Negative.     Cardiovascular: Negative.    Gastrointestinal: Negative.    Genitourinary: Negative.    Musculoskeletal:         As HPI   Neurological: Negative.    Hematological: Negative.    Psychiatric/Behavioral: Negative.         Objective   Vitals:  /77 (BP Location: Left arm, Patient Position: Sitting, BP Cuff Size: Adult)   Pulse 90   Temp 36.8 °C (98.2 °F) (Temporal)   Ht 1.475 m (4' 10.07\")   Wt 60.5 kg " (133 lb 6.4 oz)   SpO2 97%   BMI 27.81 kg/m²       Physical Exam  Vitals reviewed.   Constitutional:       General: She is not in acute distress.     Appearance: Normal appearance.   HENT:      Head: Normocephalic and atraumatic.      Right Ear: Tympanic membrane, ear canal and external ear normal.      Left Ear: Tympanic membrane, ear canal and external ear normal.      Mouth/Throat:      Mouth: Mucous membranes are moist.      Pharynx: No oropharyngeal exudate or posterior oropharyngeal erythema.   Eyes:      General: No scleral icterus.     Extraocular Movements: Extraocular movements intact.      Conjunctiva/sclera: Conjunctivae normal.      Pupils: Pupils are equal, round, and reactive to light.   Neck:      Vascular: No carotid bruit.   Cardiovascular:      Rate and Rhythm: Normal rate and regular rhythm.      Pulses: Normal pulses.      Heart sounds: Normal heart sounds. No murmur heard.  Pulmonary:      Effort: Pulmonary effort is normal. No respiratory distress.      Breath sounds: No wheezing or rales.   Chest:   Breasts:     Right: Normal. No mass or tenderness.      Left: Normal. No mass or tenderness.   Abdominal:      General: Abdomen is flat. Bowel sounds are normal. There is no distension.      Palpations: Abdomen is soft. There is no mass.      Tenderness: There is no abdominal tenderness.   Musculoskeletal:         General: Normal range of motion.      Cervical back: Normal range of motion and neck supple.      Right lower leg: No edema.      Left lower leg: No edema.   Lymphadenopathy:      Cervical: No cervical adenopathy.      Upper Body:      Right upper body: No supraclavicular or axillary adenopathy.      Left upper body: No supraclavicular or axillary adenopathy.   Skin:     Comments: Skin exam done by her dermatologist Dr. Swan   Neurological:      General: No focal deficit present.      Mental Status: She is alert and oriented to person, place, and time.   Psychiatric:         Mood and  Affect: Mood normal.         Assessment & Plan  Routine general medical examination at health care facility    Orders:    1 Year Follow Up In [facility] - PCP - Wellness Exam; Future    Medicare annual wellness visit, subsequent         Annual physical exam  Complete physical examination completed today.  Advised to keep a heart healthy, low-fat diet as recommended diet is the Mediterranean diet.  Advised to exercise regularly for 30 minutes daily 5 days a week and maintain 150 minutes of exercise per week.  Discussed age-appropriate cancer screening,Immunization and recommendation were given.  Advised on regular eye and dental visit.  Advised on staying well-hydrated.         Benign essential hypertension  Stable on current medication follow-up in 6 months  Orders:    ECG 12 lead (Clinic Performed)    Diabetes mellitus type 2 without retinopathy (Multi)  Continue 1800-calorie ADA diet and regular exercise ,A1c at goal.  Continue diet and exercise, doing well without medication and just with lifestyle modification.      Orders:    CBC; Future    Comprehensive Metabolic Panel; Future    Hemoglobin A1C; Future    Other hyperlipidemia  Follow low-fat diet.    Orders:    Lipid Panel; Future    TSH with reflex to Free T4 if abnormal; Future    Asymptomatic menopause    Orders:    XR DEXA bone density; Future    Exudative age-related macular degeneration, left eye, with inactive choroidal neovascularization  Monitored regularly by her ophthalmologist         Osteopenia, unspecified location  Continue calcium and vitamin D  She is due for DEXA scan

## 2025-04-28 ENCOUNTER — APPOINTMENT (OUTPATIENT)
Dept: PODIATRY | Facility: CLINIC | Age: 70
End: 2025-04-28
Payer: MEDICARE

## 2025-04-28 DIAGNOSIS — M79.674 PAIN IN TOES OF BOTH FEET: ICD-10-CM

## 2025-04-28 DIAGNOSIS — B35.1 NAIL FUNGUS: Primary | ICD-10-CM

## 2025-04-28 DIAGNOSIS — M79.675 PAIN IN TOES OF BOTH FEET: ICD-10-CM

## 2025-04-28 DIAGNOSIS — E11.9 DIABETES MELLITUS TYPE 2 WITHOUT RETINOPATHY (MULTI): ICD-10-CM

## 2025-04-28 PROCEDURE — 11721 DEBRIDE NAIL 6 OR MORE: CPT | Performed by: PODIATRIST

## 2025-04-28 NOTE — PROGRESS NOTES
/History Of Present Illness  Lydia Lowe is a 69 y.o. female presenting for diabetic nail care. Patient complains with painful elongated nails.     PCP Marya Proctor MD  last visit 04/25/25     Past Medical History  She has a past medical history of Allergic (1980s?), Asthma (2000?), Bunion, Callus, Cataract (2022), Diabetes mellitus (Multi) (2016), Eczema (2018), Encounter for general adult medical examination without abnormal findings (11/14/2012), Encounter for gynecological examination (general) (routine) without abnormal findings, ESR raised (10/19/2023), Gastro-esophageal reflux disease with esophagitis, without bleeding (07/18/2014), GERD (gastroesophageal reflux disease) (1999), Hammer toe, High arches, Hypertension (2001?), Impaired fasting glucose, Ingrown toenail, Other conditions influencing health status, Other conditions influencing health status (12/12/2012), Other specified noninflammatory disorders of vulva and perineum (08/30/2021), Personal history of diseases of the skin and subcutaneous tissue (04/12/2013), Personal history of other diseases of the circulatory system, Personal history of other diseases of the respiratory system, Personal history of other diseases of the respiratory system, Personal history of other diseases of the respiratory system, Urinary tract infection (Over 5 years ago?), Varicella (1958?), and Visual impairment (1963?).    Surgical History  She has a past surgical history that includes Colonoscopy (11/27/2012); Other surgical history (11/27/2012); Dilation and curettage of uterus (12/07/2012); Other surgical history (08/18/2017); Eye surgery (1993); [place] tooth extraction (1994?); and Toenail excision.     Social History  She reports that she has never smoked. She has never used smokeless tobacco. She reports current alcohol use. She reports that she does not use drugs.    Family History  Family History   Problem Relation Name Age of Onset    Breast cancer  Mother Mom? 81    Asthma Mother Mom?     Hypertension Mother Mom?     Osteoporosis Mother Mom?     Brain cancer Father Dad?     Diabetes Father Dad?     Colon cancer Father Dad?     COPD Father Dad?     Hypertension Father Dad?     Cancer Father Dad?     Heart attack Sister Dayna     Stroke Sister Dayna     Osteoporosis Sister Dyana     Coronary artery disease Brother          CABG    Stroke Brother      Hypertension Sibling      Stroke Sister Sheridan     Osteoporosis Sister Sheridan         Allergies  Carbapenems and Penicillins    Medications  Current Outpatient Medications   Medication Sig Dispense Refill    albuterol 90 mcg/actuation inhaler 2 puffs every 4-6 hrs prn. 25.5 g 3    isma cit-mag-D3-Zn--man-bor (Citracal-D3 Plus Magnesium) 250- mg-mg-unit tablet Take 1 tablet by mouth once daily.      cholecalciferol (Vitamin D3) 25 MCG (1000 UT) tablet Take 1 tablet (25 mcg) by mouth once daily.      cinnamon bark (CINNAMON ORAL) Take 2,000 mg by mouth once daily. CAPSULES      esomeprazole (NexIUM) 20 mg DR capsule Take 1 capsule (20 mg) by mouth once daily in the morning. Take before meals. Do not open capsule.      fluticasone (Cutivate) 0.05 % cream Apply topically.      loratadine (CLARITIN ORAL) Take 5 mg by mouth if needed. DISSOLVABLE      losartan (Cozaar) 50 mg tablet Take 1 tablet (50 mg) by mouth once daily. 90 tablet 3    mometasone (Nasonex) 50 mcg/actuation nasal spray Administer 2 sprays into each nostril once daily.      mometasone-formoterol (Dulera) 200-5 mcg/actuation inhaler INHALE 2 PUFFS AT 12 HOUR INTERVALS (MORNING AND EVENING) 39 g 0    montelukast (Singulair) 10 mg tablet TAKE 1 TABLET BY MOUTH ONCE DAILY 90 tablet 3    multivit-min/iron/folic/lutein (CENTRUM SILVER WOMEN ORAL) Take 1 tablet by mouth once daily.      olopatadine (Patanol) 0.1 % ophthalmic solution Administer 1 drop into both eyes 2 times a day. PRN      omega-3 fatty acids-fish oil 300-1,000 mg capsule Take 1 capsule (1,000  mg) by mouth once daily.       No current facility-administered medications for this visit.       Review of Systems    REVIEW OF SYSTEMS  GENERAL:  Negative for malaise, significant weight loss, fever  CARDIOVASCULAR: leg swelling   MUSCULOSKELETAL:  Negative for joint pain or swelling, back pain, and muscle pain.  SKIN:  Negative for lesions, rash, and itching  PSYCH:  Negative for sleep disturbance, mood disorder and recent psychosocial stressors  NEURO: Negative, denies any burning, tingling or numbness     Objective: last A1c is 5.2  Vasc: DP and PT pulses are palpable bilateral.  CFT is less than 3 seconds bilateral.  Skin temperature is warm to cool proximal to distal bilateral.  No edema, no hair on toes     Neuro:  Light touch is intact to the foot bilateral.   There is no clonus noted.  The hallux is downgoing bilateral.  Vibration intact with hypersensitivity.  Light touch intact with some Candice filament      Derm: Nails 1-5 bilateral are thickened, elongated and crumbly with subungual debris. Skin is supple with normal texture and turgor noted.  Webspaces are clean, dry and intact bilateral.  There are  hyperkeratoses, ulcerations, verruca or other lesions noted.  Calluses subsecond metatarsal head -not preulcerative b/l , left is worse     Ortho: Muscle strength is 5/5 for all pedal groups tested.  Ankle joint, subtalar joint, 1st MPJ and lesser MPJ ROM is full and without pain or crepitus.  The foot type is rectus bilateral off weight bearing.  Large bunion deformity that causes some discomfort    Assessment/Plan   Comprehensive diabetic foot exam  DM  Painful nail mycosis    Patient's calluses do not seem  preulcerative.  I do not feel patient has it at significant risk for foot ulceration.    Toenails are debrided in length and thickness to avoid infection and for pain relief     A1c is in normal range       Coby Maxwell, MARIO  [address] [place] [address]

## 2025-04-29 DIAGNOSIS — I10 BENIGN ESSENTIAL HYPERTENSION: ICD-10-CM

## 2025-04-29 DIAGNOSIS — H53.8 BLURRY VISION: ICD-10-CM

## 2025-04-29 DIAGNOSIS — G45.3 AMAUROSIS FUGAX: Primary | ICD-10-CM

## 2025-04-29 DIAGNOSIS — R00.2 PALPITATIONS: ICD-10-CM

## 2025-05-06 LAB
ALBUMIN SERPL-MCNC: 4.1 G/DL (ref 3.6–5.1)
ALP SERPL-CCNC: 67 U/L (ref 37–153)
ALT SERPL-CCNC: 15 U/L (ref 6–29)
ANION GAP SERPL CALCULATED.4IONS-SCNC: 9 MMOL/L (CALC) (ref 7–17)
AST SERPL-CCNC: 16 U/L (ref 10–35)
BILIRUB SERPL-MCNC: 0.5 MG/DL (ref 0.2–1.2)
BUN SERPL-MCNC: 12 MG/DL (ref 7–25)
CALCIUM SERPL-MCNC: 9.5 MG/DL (ref 8.6–10.4)
CHLORIDE SERPL-SCNC: 104 MMOL/L (ref 98–110)
CHOLEST SERPL-MCNC: 168 MG/DL
CHOLEST/HDLC SERPL: 3 (CALC)
CO2 SERPL-SCNC: 27 MMOL/L (ref 20–32)
CREAT SERPL-MCNC: 0.62 MG/DL (ref 0.5–1.05)
EGFRCR SERPLBLD CKD-EPI 2021: 97 ML/MIN/1.73M2
ERYTHROCYTE [DISTWIDTH] IN BLOOD BY AUTOMATED COUNT: 13.3 % (ref 11–15)
EST. AVERAGE GLUCOSE BLD GHB EST-MCNC: 114 MG/DL
EST. AVERAGE GLUCOSE BLD GHB EST-SCNC: 6.3 MMOL/L
GLUCOSE SERPL-MCNC: 86 MG/DL (ref 65–99)
HBA1C MFR BLD: 5.6 %
HCT VFR BLD AUTO: 40.9 % (ref 35–45)
HDLC SERPL-MCNC: 56 MG/DL
HGB BLD-MCNC: 13.3 G/DL (ref 11.7–15.5)
LDLC SERPL CALC-MCNC: 93 MG/DL (CALC)
MCH RBC QN AUTO: 32.1 PG (ref 27–33)
MCHC RBC AUTO-ENTMCNC: 32.5 G/DL (ref 32–36)
MCV RBC AUTO: 98.8 FL (ref 80–100)
NONHDLC SERPL-MCNC: 112 MG/DL (CALC)
PLATELET # BLD AUTO: 215 THOUSAND/UL (ref 140–400)
PMV BLD REES-ECKER: 10.4 FL (ref 7.5–12.5)
POTASSIUM SERPL-SCNC: 4.1 MMOL/L (ref 3.5–5.3)
PROT SERPL-MCNC: 6.4 G/DL (ref 6.1–8.1)
RBC # BLD AUTO: 4.14 MILLION/UL (ref 3.8–5.1)
SODIUM SERPL-SCNC: 140 MMOL/L (ref 135–146)
TRIGL SERPL-MCNC: 95 MG/DL
TSH SERPL-ACNC: 2.71 MIU/L (ref 0.4–4.5)
WBC # BLD AUTO: 7.5 THOUSAND/UL (ref 3.8–10.8)

## 2025-05-14 ENCOUNTER — HOSPITAL ENCOUNTER (OUTPATIENT)
Dept: RADIOLOGY | Facility: CLINIC | Age: 70
Discharge: HOME | End: 2025-05-14
Payer: MEDICARE

## 2025-05-14 DIAGNOSIS — Z78.0 ASYMPTOMATIC MENOPAUSE: ICD-10-CM

## 2025-05-14 PROCEDURE — 77080 DXA BONE DENSITY AXIAL: CPT | Performed by: RADIOLOGY

## 2025-05-14 PROCEDURE — 77080 DXA BONE DENSITY AXIAL: CPT

## 2025-06-30 ENCOUNTER — APPOINTMENT (OUTPATIENT)
Dept: PODIATRY | Facility: CLINIC | Age: 70
End: 2025-06-30
Payer: MEDICARE

## 2025-06-30 DIAGNOSIS — J45.41 MODERATE PERSISTENT ALLERGIC ASTHMA WITH ACUTE EXACERBATION (HHS-HCC): ICD-10-CM

## 2025-06-30 DIAGNOSIS — B35.1 NAIL FUNGUS: ICD-10-CM

## 2025-06-30 DIAGNOSIS — E11.9 DIABETES MELLITUS TYPE 2 WITHOUT RETINOPATHY (MULTI): Primary | ICD-10-CM

## 2025-06-30 DIAGNOSIS — M20.42 HAMMER TOE OF LEFT FOOT: ICD-10-CM

## 2025-06-30 PROCEDURE — A9270 NON-COVERED ITEM OR SERVICE: HCPCS | Performed by: PODIATRIST

## 2025-06-30 PROCEDURE — 99213 OFFICE O/P EST LOW 20 MIN: CPT | Performed by: PODIATRIST

## 2025-06-30 RX ORDER — MOMETASONE FUROATE AND FORMOTEROL FUMARATE DIHYDRATE 200; 5 UG/1; UG/1
AEROSOL RESPIRATORY (INHALATION)
Qty: 39 G | Refills: 3 | Status: SHIPPED | OUTPATIENT
Start: 2025-06-30

## 2025-06-30 NOTE — PROGRESS NOTES
/History Of Present Illness  Lydia Lowe is a 69 y.o. female presenting for diabetic nail care. Patient complains with painful elongated nails.  Today patient complains of painful third toe on the left foot.  She states it is burning.    PCP Marya Proctor MD  last visit 04/25/25     Past Medical History  She has a past medical history of Allergic (1980s?), Asthma (2000?), Bunion (no idea -- at least 5 years ago. My chart should say.), Callus (No idea -- my chart should say. Probably at least 5 years ago), Cataract (2022), Diabetes mellitus (Multi) (2016), Eczema (2018), Encounter for general adult medical examination without abnormal findings (11/14/2012), Encounter for gynecological examination (general) (routine) without abnormal findings, ESR raised (10/19/2023), Gastro-esophageal reflux disease with esophagitis, without bleeding (07/18/2014), GERD (gastroesophageal reflux disease) (1999), Hammer toe (No idea -- my chart should say. Probably at least 5 years ago), High arches (Probably all my life), Hypertension (2001?), Impaired fasting glucose, Ingrown toenail (No idea -- my chart should say. Probably at least 5 years ago), Other conditions influencing health status, Other conditions influencing health status (12/12/2012), Other specified noninflammatory disorders of vulva and perineum (08/30/2021), Personal history of diseases of the skin and subcutaneous tissue (04/12/2013), Personal history of other diseases of the circulatory system, Personal history of other diseases of the respiratory system, Personal history of other diseases of the respiratory system, Personal history of other diseases of the respiratory system, Urinary tract infection (Over 5 years ago?), Varicella (1958?), and Visual impairment (1963?).    Surgical History  She has a past surgical history that includes Colonoscopy (11/27/2012); Other surgical history (11/27/2012); Dilation and curettage of uterus (12/07/2012); Other surgical  history (08/18/2017); Eye surgery (1993); [place] tooth extraction (1994?); and Toenail excision (sometime in the late 80s or early 90s).     Social History  She reports that she has never smoked. She has never used smokeless tobacco. She reports current alcohol use. She reports that she does not use drugs.    Family History  Family History   Problem Relation Name Age of Onset    Breast cancer Mother Mom? 81    Asthma Mother Mom?     Hypertension Mother Mom?     Osteoporosis Mother Mom?     Brain cancer Father Dad?     Diabetes Father Dad?     Colon cancer Father Dad?     COPD Father Dad?     Hypertension Father Dad?     Cancer Father Dad?     Heart attack Sister Dayna     Stroke Sister Dayna     Osteoporosis Sister Dayna     Coronary artery disease Brother          CABG    Stroke Brother      Hypertension Sibling      Stroke Sister Sheridan     Osteoporosis Sister Sheridan         Allergies  Carbapenems and Penicillins    Medications  Current Outpatient Medications   Medication Sig Dispense Refill    albuterol 90 mcg/actuation inhaler 2 puffs every 4-6 hrs prn. 25.5 g 3    isma cit-mag-D3-Zn--man-bor (Citracal-D3 Plus Magnesium) 250- mg-mg-unit tablet Take 1 tablet by mouth once daily.      cholecalciferol (Vitamin D3) 25 MCG (1000 UT) tablet Take 1 tablet (25 mcg) by mouth once daily.      cinnamon bark (CINNAMON ORAL) Take 2,000 mg by mouth once daily. CAPSULES      esomeprazole (NexIUM) 20 mg DR capsule Take 1 capsule (20 mg) by mouth once daily in the morning. Take before meals. Do not open capsule.      fluticasone (Cutivate) 0.05 % cream Apply topically.      losartan (Cozaar) 50 mg tablet Take 1 tablet (50 mg) by mouth once daily. 90 tablet 3    mometasone (Nasonex) 50 mcg/actuation nasal spray Administer 2 sprays into each nostril once daily.      mometasone-formoterol (Dulera) 200-5 mcg/actuation inhaler INHALE 2 PUFFS AT 12 HOUR INTERVALS (MORNING AND EVENING) 39 g 0    montelukast (Singulair) 10 mg tablet  TAKE 1 TABLET BY MOUTH ONCE DAILY 90 tablet 3    multivit-min/iron/folic/lutein (CENTRUM SILVER WOMEN ORAL) Take 1 tablet by mouth once daily.      omega-3 fatty acids-fish oil 300-1,000 mg capsule Take 1 capsule (1,000 mg) by mouth once daily.      loratadine (CLARITIN ORAL) Take 5 mg by mouth if needed. DISSOLVABLE      olopatadine (Patanol) 0.1 % ophthalmic solution Administer 1 drop into both eyes 2 times a day. PRN       No current facility-administered medications for this visit.       Review of Systems    REVIEW OF SYSTEMS  GENERAL:  Negative for malaise, significant weight loss, fever  CARDIOVASCULAR: leg swelling   MUSCULOSKELETAL:  Negative for joint pain or swelling, back pain, and muscle pain.  SKIN:  Negative for lesions, rash, and itching  PSYCH:  Negative for sleep disturbance, mood disorder and recent psychosocial stressors  NEURO: Negative, denies any burning, tingling or numbness     Objective: last A1c is 5.2  Vasc: DP and PT pulses are palpable bilateral.  CFT is less than 3 seconds bilateral.  Skin temperature is warm to cool proximal to distal bilateral.  No edema, no hair on toes     Neuro:  Light touch is intact to the foot bilateral.   There is no clonus noted.  The hallux is downgoing bilateral.  Vibration intact with hypersensitivity.  Light touch intact with some Candice filament      Derm: Nails 1-5 bilateral are thickened, elongated and crumbly with subungual debris. Skin is supple with normal texture and turgor noted.  Webspaces are clean, dry and intact bilateral.  There are  hyperkeratoses, ulcerations, verruca or other lesions noted.  Calluses subsecond metatarsal head -not preulcerative b/l , left is worse     Ortho: Muscle strength is 5/5 for all pedal groups tested.  Ankle joint, subtalar joint, 1st MPJ and lesser MPJ ROM is full and without pain or crepitus.  The foot type is rectus bilateral off weight bearing.  Large bunion deformity that causes some discomfort.  Patient has  hammertoe deformity that is flexible third toe left foot.  Area is very tender to touch.    Assessment/Plan   Comprehensive diabetic foot exam  DM  Painful nail mycosis  Painful hammertoe deformity    Toenails are debrided in length and thickness to avoid infection and for pain relief  Calluses removed from the third left toe.  We discussed the option of surgery as a last resort.  Today a splint is given to the patient in order to offload the third toe.  Patient would need tenotomy procedure.      Coby Maxwell DPM  [id number] [place] [address]

## 2025-09-02 ENCOUNTER — APPOINTMENT (OUTPATIENT)
Dept: PODIATRY | Facility: CLINIC | Age: 70
End: 2025-09-02
Payer: MEDICARE

## 2025-09-29 ENCOUNTER — APPOINTMENT (OUTPATIENT)
Dept: ALLERGY | Facility: CLINIC | Age: 70
End: 2025-09-29
Payer: MEDICARE

## 2025-10-24 ENCOUNTER — APPOINTMENT (OUTPATIENT)
Dept: PRIMARY CARE | Facility: CLINIC | Age: 70
End: 2025-10-24
Payer: MEDICARE

## 2025-11-06 ENCOUNTER — APPOINTMENT (OUTPATIENT)
Dept: PODIATRY | Facility: CLINIC | Age: 70
End: 2025-11-06
Payer: MEDICARE

## 2026-04-24 ENCOUNTER — APPOINTMENT (OUTPATIENT)
Dept: PRIMARY CARE | Facility: CLINIC | Age: 71
End: 2026-04-24
Payer: MEDICARE